# Patient Record
Sex: MALE | Race: WHITE | NOT HISPANIC OR LATINO | Employment: FULL TIME | URBAN - METROPOLITAN AREA
[De-identification: names, ages, dates, MRNs, and addresses within clinical notes are randomized per-mention and may not be internally consistent; named-entity substitution may affect disease eponyms.]

---

## 2019-10-02 ENCOUNTER — APPOINTMENT (OUTPATIENT)
Dept: PREADMISSION TESTING | Facility: HOSPITAL | Age: 54
End: 2019-10-02

## 2019-10-02 ENCOUNTER — HOSPITAL ENCOUNTER (OUTPATIENT)
Dept: GENERAL RADIOLOGY | Facility: HOSPITAL | Age: 54
Discharge: HOME | End: 2019-10-02

## 2019-10-02 ENCOUNTER — HOSPITAL ENCOUNTER (OUTPATIENT)
Dept: GENERAL RADIOLOGY | Facility: HOSPITAL | Age: 54
Discharge: HOME | End: 2019-10-02
Admitting: ORTHOPAEDIC SURGERY

## 2019-10-02 VITALS
HEART RATE: 65 BPM | OXYGEN SATURATION: 100 % | HEIGHT: 70 IN | WEIGHT: 200 LBS | RESPIRATION RATE: 20 BRPM | TEMPERATURE: 97 F | BODY MASS INDEX: 28.63 KG/M2 | SYSTOLIC BLOOD PRESSURE: 122 MMHG | DIASTOLIC BLOOD PRESSURE: 84 MMHG

## 2019-10-02 DIAGNOSIS — Z96.641 HISTORY OF TOTAL RIGHT HIP REPLACEMENT: ICD-10-CM

## 2019-10-02 LAB
ABO GROUP BLD: NORMAL
ALBUMIN SERPL-MCNC: 4.6 G/DL (ref 3.5–5.2)
ALBUMIN/GLOB SERPL: 2.6 G/DL
ALP SERPL-CCNC: 44 U/L (ref 39–117)
ALT SERPL W P-5'-P-CCNC: 17 U/L (ref 1–41)
ANION GAP SERPL CALCULATED.3IONS-SCNC: 8.9 MMOL/L (ref 5–15)
AST SERPL-CCNC: 15 U/L (ref 1–40)
BILIRUB SERPL-MCNC: 1.4 MG/DL (ref 0.2–1.2)
BLD GP AB SCN SERPL QL: NEGATIVE
BUN BLD-MCNC: 17 MG/DL (ref 6–20)
BUN/CREAT SERPL: 20.5 (ref 7–25)
CALCIUM SPEC-SCNC: 9.2 MG/DL (ref 8.6–10.5)
CHLORIDE SERPL-SCNC: 107 MMOL/L (ref 98–107)
CO2 SERPL-SCNC: 27.1 MMOL/L (ref 22–29)
CREAT BLD-MCNC: 0.83 MG/DL (ref 0.76–1.27)
DEPRECATED RDW RBC AUTO: 41.3 FL (ref 37–54)
ERYTHROCYTE [DISTWIDTH] IN BLOOD BY AUTOMATED COUNT: 12.5 % (ref 12.3–15.4)
GFR SERPL CREATININE-BSD FRML MDRD: 96 ML/MIN/1.73
GLOBULIN UR ELPH-MCNC: 1.8 GM/DL
GLUCOSE BLD-MCNC: 105 MG/DL (ref 65–99)
HBA1C MFR BLD: 5.2 % (ref 4.8–5.6)
HCT VFR BLD AUTO: 44.1 % (ref 37.5–51)
HGB BLD-MCNC: 14.1 G/DL (ref 13–17.7)
INR PPP: 1 (ref 0.9–1.1)
MCH RBC QN AUTO: 28.7 PG (ref 26.6–33)
MCHC RBC AUTO-ENTMCNC: 32 G/DL (ref 31.5–35.7)
MCV RBC AUTO: 89.6 FL (ref 79–97)
PLATELET # BLD AUTO: 167 10*3/MM3 (ref 140–450)
PMV BLD AUTO: 9.8 FL (ref 6–12)
POTASSIUM BLD-SCNC: 4.1 MMOL/L (ref 3.5–5.2)
PROT SERPL-MCNC: 6.4 G/DL (ref 6–8.5)
PROTHROMBIN TIME: 12.9 SECONDS (ref 11.7–14.2)
RBC # BLD AUTO: 4.92 10*6/MM3 (ref 4.14–5.8)
RH BLD: POSITIVE
SODIUM BLD-SCNC: 143 MMOL/L (ref 136–145)
T&S EXPIRATION DATE: NORMAL
WBC NRBC COR # BLD: 3.25 10*3/MM3 (ref 3.4–10.8)

## 2019-10-02 PROCEDURE — 73502 X-RAY EXAM HIP UNI 2-3 VIEWS: CPT

## 2019-10-02 PROCEDURE — 86850 RBC ANTIBODY SCREEN: CPT | Performed by: ORTHOPAEDIC SURGERY

## 2019-10-02 PROCEDURE — 93005 ELECTROCARDIOGRAM TRACING: CPT

## 2019-10-02 PROCEDURE — 85610 PROTHROMBIN TIME: CPT | Performed by: ORTHOPAEDIC SURGERY

## 2019-10-02 PROCEDURE — 83036 HEMOGLOBIN GLYCOSYLATED A1C: CPT | Performed by: ORTHOPAEDIC SURGERY

## 2019-10-02 PROCEDURE — 71046 X-RAY EXAM CHEST 2 VIEWS: CPT

## 2019-10-02 PROCEDURE — 86901 BLOOD TYPING SEROLOGIC RH(D): CPT | Performed by: ORTHOPAEDIC SURGERY

## 2019-10-02 PROCEDURE — 36415 COLL VENOUS BLD VENIPUNCTURE: CPT

## 2019-10-02 PROCEDURE — 93010 ELECTROCARDIOGRAM REPORT: CPT | Performed by: INTERNAL MEDICINE

## 2019-10-02 PROCEDURE — 85027 COMPLETE CBC AUTOMATED: CPT | Performed by: ORTHOPAEDIC SURGERY

## 2019-10-02 PROCEDURE — 80053 COMPREHEN METABOLIC PANEL: CPT | Performed by: ORTHOPAEDIC SURGERY

## 2019-10-02 PROCEDURE — 86900 BLOOD TYPING SEROLOGIC ABO: CPT | Performed by: ORTHOPAEDIC SURGERY

## 2019-10-02 RX ORDER — CELECOXIB 100 MG/1
100 CAPSULE ORAL DAILY
COMMUNITY

## 2019-10-02 RX ORDER — AMLODIPINE BESYLATE AND BENAZEPRIL HYDROCHLORIDE 10; 40 MG/1; MG/1
CAPSULE ORAL
COMMUNITY
Start: 2019-02-15

## 2019-10-02 RX ORDER — IBUPROFEN 800 MG/1
1 TABLET ORAL 3 TIMES DAILY
Refills: 5 | COMMUNITY
Start: 2019-09-10 | End: 2019-10-17 | Stop reason: HOSPADM

## 2019-10-02 ASSESSMENT — HOOS JR
HOOS JR SCORE: 32.735
HOOS JR SCORE: 18

## 2019-10-02 NOTE — DISCHARGE INSTRUCTIONS
Take the following medications the morning of surgery with a small sip of water: AMLODIPINE AM OF SURGERY    PLEASE ARRIVE AT THE  HOSPITAL AT: 0700 AM    General Instructions:  • Do not eat solid food after midnight the night before surgery.  • You may drink clear liquids day of surgery but must stop at least one hour before your hospital arrival time.  • It is beneficial for you to have a clear drink that contains carbohydrates the day of surgery.  We suggest a 12 to 20 ounce bottle of Gatorade or Powerade for non-diabetic patients or a 12 to 20 ounce bottle of G2 or Powerade Zero for diabetic patients. (Pediatric patients, are not advised to drink a 12 to 20 ounce carbohydrate drink)    Clear liquids are liquids you can see through.  Nothing red in color.     Plain water                               Sports drinks  Sodas                                   Gelatin (Jell-O)  Fruit juices without pulp such as white grape juice and apple juice  Popsicles that contain no fruit or yogurt  Tea or coffee (no cream or milk added)  Gatorade / Powerade  G2 / Powerade Zero    • Infants may have breast milk up to four hours before surgery.  • Infants drinking formula may drink formula up to six hours before surgery.   • Patients who avoid smoking, chewing tobacco and alcohol for 4 weeks prior to surgery have a reduced risk of post-operative complications.  Quit smoking as many days before surgery as you can.  • Do not smoke, use chewing tobacco or drink alcohol the day of surgery.   • If applicable bring your C-PAP/ BI-PAP machine.  • Bring any papers given to you in the doctor’s office.  • Wear clean comfortable clothes.  • Do not wear contact lenses, false eyelashes or make-up.  Bring a case for your glasses.   • Bring crutches or walker if applicable.  • Remove all piercings.  Leave jewelry and any other valuables at home.  • Hair extensions with metal clips must be removed prior to surgery.  • The Pre-Admission Testing  nurse will instruct you to bring medications if unable to obtain an accurate list in Pre-Admission Testing.        If you were given a blood bank ID arm band remember to bring it with you the day of surgery.    Preventing a Surgical Site Infection:  • For 2 to 3 days before surgery, avoid shaving with a razor because the razor can irritate skin and make it easier to develop an infection.    • Any areas of open skin can increase the risk of a post-operative wound infection by allowing bacteria to enter and travel throughout the body.  Notify your surgeon if you have any skin wounds / rashes even if it is not near the expected surgical site.  The area will need assessed to determine if surgery should be delayed until it is healed.  • The night prior to surgery sleep in a clean bed with clean clothing.  Do not allow pets to sleep with you.  • Shower on the morning of surgery using a fresh bar of anti-bacterial soap (such as Dial) and clean washcloth.  Dry with a clean towel and dress in clean clothing.  • Ask your surgeon if you will be receiving antibiotics prior to surgery.  • Make sure you, your family, and all healthcare providers clean their hands with soap and water or an alcohol based hand  before caring for you or your wound.    Day of surgery:  Your arrival time is approximately two hours before your scheduled surgery time.  Upon arrival, a Pre-op nurse and Anesthesiologist will review your health history, obtain vital signs, and answer questions you may have.  The only belongings needed at this time will be a list of your home medications and if applicable your C-PAP/BI-PAP machine.  If you are staying overnight your family can leave the rest of your belongings in the car and bring them to your room later.  A Pre-op nurse will start an IV and you may receive medication in preparation for surgery, including something to help you relax.  Your family will be able to see you in the Pre-op area.  Two  visitors at a time will be allowed in the Pre-op room.  While you are in surgery your family should notify the waiting room  if they leave the waiting room area and provide a contact phone number.    Please be aware that surgery does come with discomfort.  We want to make every effort to control your discomfort so please discuss any uncontrolled symptoms with your nurse.   Your doctor will most likely have prescribed pain medications.      If you are going home after surgery you will receive individualized written care instructions before being discharged.  A responsible adult must drive you to and from the hospital on the day of your surgery and stay with you for 24 hours.    If you are staying overnight following surgery, you will be transported to your hospital room following the recovery period.  [facility] has all private rooms.    You have received a list of surgical assistants for your reference.  If you have any questions please call Pre-Admission Testing at [phone].  Deductibles and co-payments are collected on the day of service. Please be prepared to pay the required co-pay, deductible or deposit on the day of service as defined by your plan.    2% CHLORAHEXIDINE GLUCONATE* CLOTH  Preparing or “prepping” skin before surgery can reduce the risk of infection at the surgical site. To make the process easier, [facility] has chosen disposable cloths moistened with a rinse-free, 2% Chlorhexidine Gluconate (CHG) antiseptic solution. The steps below outline the prepping process and should be carefully followed.        Use the prep cloth on the area that is circled in the diagram             Directions Night before Surgery  1) Shower using a fresh bar of anti-bacterial soap (such as Dial) and clean washcloth.  Use a clean towel to completely dry your skin.  2) Do not use any lotions, oils or creams on your skin.  3) Open the package and remove 1 cloth, wipe your skin for  30 seconds in a circular motion.  Allow to dry for 3 minutes.  4) Repeat #3 with second cloth.  5) Do not touch your eyes, ears, or mouth with the prep cloth.  6) Allow the wet prep solution to air dry.  7) Discard the prep cloth and wash your hands with soap and water.   8) Dress in clean bed clothes and sleep on fresh clean bed sheets.   9) You may experience some temporary itching after the prep.    Directions Day of Surgery  1) Repeat steps 1,2,3,4,5,6,7, and 9.   2) Dress in clean clothes before coming to the hospital.    BACTROBAN NASAL OINTMENT  There are many germs normally in your nose. Bactroban is an ointment that will help reduce these germs. Please follow these instructions for Bactroban use:      ____The day before surgery in the morning  Date________    ____The day before surgery in the evening              Date________    ____The day of surgery in the morning    Date________    **Squirt ½ package of Bactroban Ointment onto a cotton applicator and apply to inside of 1st nostril.  Squirt the remaining Bactroban and apply to the inside of the other nostril.    PERIDEX- ORAL:  Use only if your surgeon has ordered  Use the night before and morning of surgery - Swish, gargle, and spit - do not swallow.

## 2019-10-02 NOTE — PAT
"Here for PAT prior to sched RTHA with Dr Crisostomo on 10/16.  This is first joint replacement surg.  Has had hernia surgeries in past.  No problems with anesthesia.    PMH includes OA, RA, HTN and SOM (uses pap).  Takes monthly infusions for RA and states it is well controlled at present.    Drives a truck for work.  Occasional smoker.  Mod ETOH use.  \"[place]". .    States has felt well lately..No acute resp/GI illnesses.  Denies CP, SOA, MORALES, palpitations, syncope.      Exam reveals pleasant male in NAD.  Skintanned, intact.  Neuro grossly intact.  Chest CTA bilat.  S1S2 RRR.  No edema noted.    Prelim EKG shows NSR.  Labs WNL.  XRays pending.    10/2 @ 1552    Xrays reviewed.  All testing WNL.  Pt seeing PCP today, but no apparent contraindications to proposed surgery.  "

## 2019-10-15 RX ORDER — CEFAZOLIN SODIUM 2 G/100ML
2 INJECTION, SOLUTION INTRAVENOUS ONCE
Status: COMPLETED | OUTPATIENT
Start: 2019-10-15 | End: 2019-10-16

## 2019-10-16 ENCOUNTER — APPOINTMENT (OUTPATIENT)
Dept: GENERAL RADIOLOGY | Facility: HOSPITAL | Age: 54
End: 2019-10-16

## 2019-10-16 ENCOUNTER — ANESTHESIA EVENT (OUTPATIENT)
Dept: PERIOP | Facility: HOSPITAL | Age: 54
End: 2019-10-16

## 2019-10-16 ENCOUNTER — ANESTHESIA (OUTPATIENT)
Dept: PERIOP | Facility: HOSPITAL | Age: 54
End: 2019-10-16

## 2019-10-16 ENCOUNTER — HOSPITAL ENCOUNTER (OUTPATIENT)
Facility: HOSPITAL | Age: 54
Discharge: HOME | End: 2019-10-17
Attending: ORTHOPAEDIC SURGERY | Admitting: ORTHOPAEDIC SURGERY

## 2019-10-16 DIAGNOSIS — Z96.641 HISTORY OF TOTAL RIGHT HIP REPLACEMENT: Primary | ICD-10-CM

## 2019-10-16 PROBLEM — Z96.649 H/O TOTAL HIP ARTHROPLASTY: Status: ACTIVE | Noted: 2019-10-16

## 2019-10-16 PROCEDURE — C1776 JOINT DEVICE (IMPLANTABLE): HCPCS | Performed by: ORTHOPAEDIC SURGERY

## 2019-10-16 PROCEDURE — 25010000002 MIDAZOLAM PER 1 MG: Performed by: ANESTHESIOLOGY

## 2019-10-16 PROCEDURE — 94799 UNLISTED PULMONARY SVC/PX: CPT

## 2019-10-16 PROCEDURE — 97110 THERAPEUTIC EXERCISES: CPT

## 2019-10-16 PROCEDURE — 25010000002 ONDANSETRON PER 1 MG: Performed by: NURSE ANESTHETIST, CERTIFIED REGISTERED

## 2019-10-16 PROCEDURE — 73501 X-RAY EXAM HIP UNI 1 VIEW: CPT

## 2019-10-16 PROCEDURE — 25010000002 FENTANYL CITRATE (PF) 100 MCG/2ML SOLUTION: Performed by: NURSE ANESTHETIST, CERTIFIED REGISTERED

## 2019-10-16 PROCEDURE — 25010000002 NEOSTIGMINE PER 0.5 MG: Performed by: NURSE ANESTHETIST, CERTIFIED REGISTERED

## 2019-10-16 PROCEDURE — 25010000002 ROPIVACAINE PER 1 MG: Performed by: ORTHOPAEDIC SURGERY

## 2019-10-16 PROCEDURE — 25010000002 DEXAMETHASONE PER 1 MG: Performed by: NURSE ANESTHETIST, CERTIFIED REGISTERED

## 2019-10-16 PROCEDURE — 25010000002 HYDROMORPHONE PER 4 MG: Performed by: NURSE ANESTHETIST, CERTIFIED REGISTERED

## 2019-10-16 PROCEDURE — G0378 HOSPITAL OBSERVATION PER HR: HCPCS

## 2019-10-16 PROCEDURE — 72170 X-RAY EXAM OF PELVIS: CPT

## 2019-10-16 PROCEDURE — 25010000002 PROPOFOL 10 MG/ML EMULSION: Performed by: NURSE ANESTHETIST, CERTIFIED REGISTERED

## 2019-10-16 PROCEDURE — 76000 FLUOROSCOPY <1 HR PHYS/QHP: CPT

## 2019-10-16 PROCEDURE — 25010000003 CEFAZOLIN IN DEXTROSE 2-4 GM/100ML-% SOLUTION: Performed by: ORTHOPAEDIC SURGERY

## 2019-10-16 PROCEDURE — 25010000002 KETOROLAC TROMETHAMINE PER 15 MG: Performed by: ORTHOPAEDIC SURGERY

## 2019-10-16 PROCEDURE — 25010000002 KETOROLAC TROMETHAMINE PER 15 MG: Performed by: NURSE ANESTHETIST, CERTIFIED REGISTERED

## 2019-10-16 PROCEDURE — 97162 PT EVAL MOD COMPLEX 30 MIN: CPT

## 2019-10-16 PROCEDURE — 25010000002 CLONIDINE PER 1 MG: Performed by: ORTHOPAEDIC SURGERY

## 2019-10-16 DEVICE — CAP HIP VE UPCHRG: Type: IMPLANTABLE DEVICE | Site: HIP | Status: FUNCTIONAL

## 2019-10-16 DEVICE — SHLL ACET OSSEOTI G7 4H SZF 56MM: Type: IMPLANTABLE DEVICE | Site: HIP | Status: FUNCTIONAL

## 2019-10-16 DEVICE — IMPLANTABLE DEVICE
Type: IMPLANTABLE DEVICE | Site: HIP | Status: FUNCTIONAL
Brand: ECHO BI-METRIC MICROPLASTY HIP SYSTEM

## 2019-10-16 DEVICE — IMPLANTABLE DEVICE
Type: IMPLANTABLE DEVICE | Site: HIP | Status: FUNCTIONAL
Brand: BIOLOX® DELTA HIP SYSTEM

## 2019-10-16 DEVICE — SCRW ACET CORT TRILOGY S/TAP 6.5X25: Type: IMPLANTABLE DEVICE | Site: HIP | Status: FUNCTIONAL

## 2019-10-16 DEVICE — TOTAL HIP PRIMARY: Type: IMPLANTABLE DEVICE | Site: HIP | Status: FUNCTIONAL

## 2019-10-16 DEVICE — SCRW ACET CORT TRILOGY S/TAP 6.5X30: Type: IMPLANTABLE DEVICE | Site: HIP | Status: FUNCTIONAL

## 2019-10-16 DEVICE — CAP HIP TM UPCHRG: Type: IMPLANTABLE DEVICE | Site: HIP | Status: FUNCTIONAL

## 2019-10-16 DEVICE — CAP CERAM HD HIP UPCHRG: Type: IMPLANTABLE DEVICE | Site: HIP | Status: FUNCTIONAL

## 2019-10-16 DEVICE — IMPLANTABLE DEVICE: Type: IMPLANTABLE DEVICE | Site: HIP | Status: FUNCTIONAL

## 2019-10-16 RX ORDER — KETAMINE HYDROCHLORIDE 10 MG/ML
INJECTION INTRAMUSCULAR; INTRAVENOUS AS NEEDED
Status: DISCONTINUED | OUTPATIENT
Start: 2019-10-16 | End: 2019-10-16 | Stop reason: SURG

## 2019-10-16 RX ORDER — MAGNESIUM HYDROXIDE 1200 MG/15ML
LIQUID ORAL AS NEEDED
Status: DISCONTINUED | OUTPATIENT
Start: 2019-10-16 | End: 2019-10-16 | Stop reason: HOSPADM

## 2019-10-16 RX ORDER — DEXAMETHASONE SODIUM PHOSPHATE 10 MG/ML
INJECTION INTRAMUSCULAR; INTRAVENOUS AS NEEDED
Status: DISCONTINUED | OUTPATIENT
Start: 2019-10-16 | End: 2019-10-16 | Stop reason: SURG

## 2019-10-16 RX ORDER — CEFAZOLIN SODIUM 2 G/100ML
2 INJECTION, SOLUTION INTRAVENOUS EVERY 8 HOURS
Status: COMPLETED | OUTPATIENT
Start: 2019-10-16 | End: 2019-10-17

## 2019-10-16 RX ORDER — EPHEDRINE SULFATE 50 MG/ML
INJECTION, SOLUTION INTRAVENOUS AS NEEDED
Status: DISCONTINUED | OUTPATIENT
Start: 2019-10-16 | End: 2019-10-16 | Stop reason: SURG

## 2019-10-16 RX ORDER — ACETAMINOPHEN 500 MG
1000 TABLET ORAL ONCE
Status: COMPLETED | OUTPATIENT
Start: 2019-10-16 | End: 2019-10-16

## 2019-10-16 RX ORDER — NALOXONE HCL 0.4 MG/ML
0.2 VIAL (ML) INJECTION AS NEEDED
Status: DISCONTINUED | OUTPATIENT
Start: 2019-10-16 | End: 2019-10-16 | Stop reason: HOSPADM

## 2019-10-16 RX ORDER — FAMOTIDINE 10 MG/ML
20 INJECTION, SOLUTION INTRAVENOUS ONCE
Status: COMPLETED | OUTPATIENT
Start: 2019-10-16 | End: 2019-10-16

## 2019-10-16 RX ORDER — DOCUSATE SODIUM 100 MG/1
100 CAPSULE, LIQUID FILLED ORAL 2 TIMES DAILY PRN
Status: DISCONTINUED | OUTPATIENT
Start: 2019-10-16 | End: 2019-10-17 | Stop reason: HOSPADM

## 2019-10-16 RX ORDER — SODIUM CHLORIDE 0.9 % (FLUSH) 0.9 %
3 SYRINGE (ML) INJECTION EVERY 12 HOURS SCHEDULED
Status: DISCONTINUED | OUTPATIENT
Start: 2019-10-16 | End: 2019-10-17 | Stop reason: HOSPADM

## 2019-10-16 RX ORDER — MIDAZOLAM HYDROCHLORIDE 1 MG/ML
1 INJECTION INTRAMUSCULAR; INTRAVENOUS
Status: DISCONTINUED | OUTPATIENT
Start: 2019-10-16 | End: 2019-10-16 | Stop reason: HOSPADM

## 2019-10-16 RX ORDER — ASPIRIN 325 MG
325 TABLET, DELAYED RELEASE (ENTERIC COATED) ORAL EVERY 12 HOURS SCHEDULED
Status: DISCONTINUED | OUTPATIENT
Start: 2019-10-16 | End: 2019-10-17 | Stop reason: HOSPADM

## 2019-10-16 RX ORDER — SODIUM CHLORIDE, SODIUM LACTATE, POTASSIUM CHLORIDE, CALCIUM CHLORIDE 600; 310; 30; 20 MG/100ML; MG/100ML; MG/100ML; MG/100ML
125 INJECTION, SOLUTION INTRAVENOUS CONTINUOUS
Status: DISCONTINUED | OUTPATIENT
Start: 2019-10-16 | End: 2019-10-17 | Stop reason: HOSPADM

## 2019-10-16 RX ORDER — ACETAMINOPHEN 325 MG/1
650 TABLET ORAL ONCE AS NEEDED
Status: DISCONTINUED | OUTPATIENT
Start: 2019-10-16 | End: 2019-10-16 | Stop reason: HOSPADM

## 2019-10-16 RX ORDER — FENTANYL CITRATE 50 UG/ML
INJECTION, SOLUTION INTRAMUSCULAR; INTRAVENOUS AS NEEDED
Status: DISCONTINUED | OUTPATIENT
Start: 2019-10-16 | End: 2019-10-16 | Stop reason: SURG

## 2019-10-16 RX ORDER — SODIUM CHLORIDE 0.9 % (FLUSH) 0.9 %
3-10 SYRINGE (ML) INJECTION AS NEEDED
Status: DISCONTINUED | OUTPATIENT
Start: 2019-10-16 | End: 2019-10-17 | Stop reason: HOSPADM

## 2019-10-16 RX ORDER — PROMETHAZINE HYDROCHLORIDE 25 MG/1
25 TABLET ORAL ONCE AS NEEDED
Status: DISCONTINUED | OUTPATIENT
Start: 2019-10-16 | End: 2019-10-16 | Stop reason: HOSPADM

## 2019-10-16 RX ORDER — MIDAZOLAM HYDROCHLORIDE 1 MG/ML
2 INJECTION INTRAMUSCULAR; INTRAVENOUS
Status: DISCONTINUED | OUTPATIENT
Start: 2019-10-16 | End: 2019-10-16 | Stop reason: HOSPADM

## 2019-10-16 RX ORDER — KETOROLAC TROMETHAMINE 30 MG/ML
INJECTION, SOLUTION INTRAMUSCULAR; INTRAVENOUS AS NEEDED
Status: DISCONTINUED | OUTPATIENT
Start: 2019-10-16 | End: 2019-10-16 | Stop reason: SURG

## 2019-10-16 RX ORDER — PROMETHAZINE HYDROCHLORIDE 25 MG/ML
12.5 INJECTION, SOLUTION INTRAMUSCULAR; INTRAVENOUS ONCE AS NEEDED
Status: DISCONTINUED | OUTPATIENT
Start: 2019-10-16 | End: 2019-10-16 | Stop reason: HOSPADM

## 2019-10-16 RX ORDER — FENTANYL CITRATE 50 UG/ML
50 INJECTION, SOLUTION INTRAMUSCULAR; INTRAVENOUS
Status: DISCONTINUED | OUTPATIENT
Start: 2019-10-16 | End: 2019-10-16 | Stop reason: HOSPADM

## 2019-10-16 RX ORDER — GLYCOPYRROLATE 0.2 MG/ML
INJECTION INTRAMUSCULAR; INTRAVENOUS AS NEEDED
Status: DISCONTINUED | OUTPATIENT
Start: 2019-10-16 | End: 2019-10-16 | Stop reason: SURG

## 2019-10-16 RX ORDER — HYDROMORPHONE HCL 110MG/55ML
PATIENT CONTROLLED ANALGESIA SYRINGE INTRAVENOUS AS NEEDED
Status: DISCONTINUED | OUTPATIENT
Start: 2019-10-16 | End: 2019-10-16 | Stop reason: SURG

## 2019-10-16 RX ORDER — HYDROMORPHONE HYDROCHLORIDE 1 MG/ML
0.5 INJECTION, SOLUTION INTRAMUSCULAR; INTRAVENOUS; SUBCUTANEOUS
Status: DISCONTINUED | OUTPATIENT
Start: 2019-10-16 | End: 2019-10-16 | Stop reason: HOSPADM

## 2019-10-16 RX ORDER — HYDROCODONE BITARTRATE AND ACETAMINOPHEN 7.5; 325 MG/1; MG/1
1 TABLET ORAL EVERY 4 HOURS PRN
Status: DISCONTINUED | OUTPATIENT
Start: 2019-10-16 | End: 2019-10-17 | Stop reason: HOSPADM

## 2019-10-16 RX ORDER — SODIUM CHLORIDE, SODIUM LACTATE, POTASSIUM CHLORIDE, CALCIUM CHLORIDE 600; 310; 30; 20 MG/100ML; MG/100ML; MG/100ML; MG/100ML
9 INJECTION, SOLUTION INTRAVENOUS CONTINUOUS
Status: DISCONTINUED | OUTPATIENT
Start: 2019-10-16 | End: 2019-10-16

## 2019-10-16 RX ORDER — LIDOCAINE HYDROCHLORIDE 20 MG/ML
INJECTION, SOLUTION INFILTRATION; PERINEURAL AS NEEDED
Status: DISCONTINUED | OUTPATIENT
Start: 2019-10-16 | End: 2019-10-16 | Stop reason: SURG

## 2019-10-16 RX ORDER — PROMETHAZINE HYDROCHLORIDE 25 MG/ML
6.25 INJECTION, SOLUTION INTRAMUSCULAR; INTRAVENOUS
Status: DISCONTINUED | OUTPATIENT
Start: 2019-10-16 | End: 2019-10-16 | Stop reason: HOSPADM

## 2019-10-16 RX ORDER — ONDANSETRON 2 MG/ML
INJECTION INTRAMUSCULAR; INTRAVENOUS AS NEEDED
Status: DISCONTINUED | OUTPATIENT
Start: 2019-10-16 | End: 2019-10-16 | Stop reason: SURG

## 2019-10-16 RX ORDER — HYDROMORPHONE HYDROCHLORIDE 1 MG/ML
0.5 INJECTION, SOLUTION INTRAMUSCULAR; INTRAVENOUS; SUBCUTANEOUS
Status: DISCONTINUED | OUTPATIENT
Start: 2019-10-16 | End: 2019-10-17 | Stop reason: HOSPADM

## 2019-10-16 RX ORDER — KETOROLAC TROMETHAMINE 30 MG/ML
15 INJECTION, SOLUTION INTRAMUSCULAR; INTRAVENOUS EVERY 6 HOURS
Status: DISCONTINUED | OUTPATIENT
Start: 2019-10-16 | End: 2019-10-17 | Stop reason: HOSPADM

## 2019-10-16 RX ORDER — ALBUTEROL SULFATE 2.5 MG/3ML
2.5 SOLUTION RESPIRATORY (INHALATION) ONCE AS NEEDED
Status: DISCONTINUED | OUTPATIENT
Start: 2019-10-16 | End: 2019-10-16 | Stop reason: HOSPADM

## 2019-10-16 RX ORDER — GABAPENTIN 300 MG/1
600 CAPSULE ORAL ONCE
Status: COMPLETED | OUTPATIENT
Start: 2019-10-16 | End: 2019-10-16

## 2019-10-16 RX ORDER — ONDANSETRON 2 MG/ML
4 INJECTION INTRAMUSCULAR; INTRAVENOUS EVERY 6 HOURS PRN
Status: DISCONTINUED | OUTPATIENT
Start: 2019-10-16 | End: 2019-10-17 | Stop reason: HOSPADM

## 2019-10-16 RX ORDER — PROMETHAZINE HYDROCHLORIDE 25 MG/1
25 SUPPOSITORY RECTAL ONCE AS NEEDED
Status: DISCONTINUED | OUTPATIENT
Start: 2019-10-16 | End: 2019-10-16 | Stop reason: HOSPADM

## 2019-10-16 RX ORDER — TRANEXAMIC ACID 100 MG/ML
INJECTION, SOLUTION INTRAVENOUS AS NEEDED
Status: DISCONTINUED | OUTPATIENT
Start: 2019-10-16 | End: 2019-10-16 | Stop reason: SURG

## 2019-10-16 RX ORDER — PROPOFOL 10 MG/ML
VIAL (ML) INTRAVENOUS AS NEEDED
Status: DISCONTINUED | OUTPATIENT
Start: 2019-10-16 | End: 2019-10-16 | Stop reason: SURG

## 2019-10-16 RX ORDER — ACETAMINOPHEN 325 MG/1
325 TABLET ORAL EVERY 4 HOURS PRN
Status: DISCONTINUED | OUTPATIENT
Start: 2019-10-16 | End: 2019-10-17 | Stop reason: HOSPADM

## 2019-10-16 RX ORDER — SODIUM CHLORIDE 0.9 % (FLUSH) 0.9 %
3 SYRINGE (ML) INJECTION EVERY 12 HOURS SCHEDULED
Status: DISCONTINUED | OUTPATIENT
Start: 2019-10-16 | End: 2019-10-16 | Stop reason: HOSPADM

## 2019-10-16 RX ORDER — ONDANSETRON 2 MG/ML
4 INJECTION INTRAMUSCULAR; INTRAVENOUS ONCE AS NEEDED
Status: DISCONTINUED | OUTPATIENT
Start: 2019-10-16 | End: 2019-10-16 | Stop reason: HOSPADM

## 2019-10-16 RX ORDER — HYDROCODONE BITARTRATE AND ACETAMINOPHEN 7.5; 325 MG/1; MG/1
1 TABLET ORAL ONCE AS NEEDED
Status: DISCONTINUED | OUTPATIENT
Start: 2019-10-16 | End: 2019-10-16 | Stop reason: HOSPADM

## 2019-10-16 RX ORDER — FLUMAZENIL 0.1 MG/ML
0.2 INJECTION INTRAVENOUS AS NEEDED
Status: DISCONTINUED | OUTPATIENT
Start: 2019-10-16 | End: 2019-10-16 | Stop reason: HOSPADM

## 2019-10-16 RX ORDER — SODIUM CHLORIDE 0.9 % (FLUSH) 0.9 %
3-10 SYRINGE (ML) INJECTION AS NEEDED
Status: DISCONTINUED | OUTPATIENT
Start: 2019-10-16 | End: 2019-10-16 | Stop reason: HOSPADM

## 2019-10-16 RX ORDER — LABETALOL HYDROCHLORIDE 5 MG/ML
5 INJECTION, SOLUTION INTRAVENOUS
Status: DISCONTINUED | OUTPATIENT
Start: 2019-10-16 | End: 2019-10-16 | Stop reason: HOSPADM

## 2019-10-16 RX ORDER — NALOXONE HCL 0.4 MG/ML
0.1 VIAL (ML) INJECTION
Status: DISCONTINUED | OUTPATIENT
Start: 2019-10-16 | End: 2019-10-17 | Stop reason: HOSPADM

## 2019-10-16 RX ORDER — TRAMADOL HYDROCHLORIDE 50 MG/1
50 TABLET ORAL EVERY 8 HOURS PRN
Status: DISCONTINUED | OUTPATIENT
Start: 2019-10-16 | End: 2019-10-17 | Stop reason: HOSPADM

## 2019-10-16 RX ORDER — DIPHENHYDRAMINE HCL 25 MG
25 CAPSULE ORAL
Status: DISCONTINUED | OUTPATIENT
Start: 2019-10-16 | End: 2019-10-16 | Stop reason: HOSPADM

## 2019-10-16 RX ORDER — ROCURONIUM BROMIDE 10 MG/ML
INJECTION, SOLUTION INTRAVENOUS AS NEEDED
Status: DISCONTINUED | OUTPATIENT
Start: 2019-10-16 | End: 2019-10-16 | Stop reason: SURG

## 2019-10-16 RX ORDER — LIDOCAINE HYDROCHLORIDE 10 MG/ML
0.5 INJECTION, SOLUTION EPIDURAL; INFILTRATION; INTRACAUDAL; PERINEURAL ONCE AS NEEDED
Status: DISCONTINUED | OUTPATIENT
Start: 2019-10-16 | End: 2019-10-16 | Stop reason: HOSPADM

## 2019-10-16 RX ORDER — BISACODYL 5 MG/1
10 TABLET, DELAYED RELEASE ORAL DAILY PRN
Status: DISCONTINUED | OUTPATIENT
Start: 2019-10-16 | End: 2019-10-17 | Stop reason: HOSPADM

## 2019-10-16 RX ORDER — DIPHENHYDRAMINE HYDROCHLORIDE 50 MG/ML
12.5 INJECTION INTRAMUSCULAR; INTRAVENOUS
Status: DISCONTINUED | OUTPATIENT
Start: 2019-10-16 | End: 2019-10-16 | Stop reason: HOSPADM

## 2019-10-16 RX ORDER — HYDROCODONE BITARTRATE AND ACETAMINOPHEN 7.5; 325 MG/1; MG/1
2 TABLET ORAL EVERY 4 HOURS PRN
Status: DISCONTINUED | OUTPATIENT
Start: 2019-10-16 | End: 2019-10-17 | Stop reason: HOSPADM

## 2019-10-16 RX ORDER — HYDRALAZINE HYDROCHLORIDE 20 MG/ML
5 INJECTION INTRAMUSCULAR; INTRAVENOUS
Status: DISCONTINUED | OUTPATIENT
Start: 2019-10-16 | End: 2019-10-16 | Stop reason: HOSPADM

## 2019-10-16 RX ADMIN — EPHEDRINE SULFATE 10 MG: 50 INJECTION INTRAMUSCULAR; INTRAVENOUS; SUBCUTANEOUS at 10:46

## 2019-10-16 RX ADMIN — NEOSTIGMINE METHYLSULFATE 4 MG: 1 INJECTION INTRAMUSCULAR; INTRAVENOUS; SUBCUTANEOUS at 12:17

## 2019-10-16 RX ADMIN — Medication 1 MG: at 08:59

## 2019-10-16 RX ADMIN — HYDROCODONE BITARTRATE AND ACETAMINOPHEN 2 TABLET: 7.5; 325 TABLET ORAL at 21:06

## 2019-10-16 RX ADMIN — ROCURONIUM BROMIDE 40 MG: 10 INJECTION INTRAVENOUS at 10:10

## 2019-10-16 RX ADMIN — KETOROLAC TROMETHAMINE 15 MG: 30 INJECTION, SOLUTION INTRAMUSCULAR at 23:58

## 2019-10-16 RX ADMIN — KETAMINE HYDROCHLORIDE 25 MG: 10 INJECTION INTRAMUSCULAR; INTRAVENOUS at 10:25

## 2019-10-16 RX ADMIN — ROCURONIUM BROMIDE 10 MG: 10 INJECTION INTRAVENOUS at 11:31

## 2019-10-16 RX ADMIN — ONDANSETRON 4 MG: 2 INJECTION INTRAMUSCULAR; INTRAVENOUS at 11:54

## 2019-10-16 RX ADMIN — TRANEXAMIC ACID 1000 MG: 100 INJECTION, SOLUTION INTRAVENOUS at 10:30

## 2019-10-16 RX ADMIN — HYDROMORPHONE HYDROCHLORIDE 0.5 MG: 2 INJECTION INTRAMUSCULAR; INTRAVENOUS; SUBCUTANEOUS at 12:12

## 2019-10-16 RX ADMIN — GLYCOPYRROLATE 0.7 MG: 0.2 INJECTION INTRAMUSCULAR; INTRAVENOUS at 12:17

## 2019-10-16 RX ADMIN — CEFAZOLIN SODIUM 2 G: 2 INJECTION, SOLUTION INTRAVENOUS at 17:06

## 2019-10-16 RX ADMIN — HYDROMORPHONE HYDROCHLORIDE 0.5 MG: 2 INJECTION INTRAMUSCULAR; INTRAVENOUS; SUBCUTANEOUS at 12:21

## 2019-10-16 RX ADMIN — EPHEDRINE SULFATE 10 MG: 50 INJECTION INTRAMUSCULAR; INTRAVENOUS; SUBCUTANEOUS at 10:35

## 2019-10-16 RX ADMIN — FAMOTIDINE 20 MG: 10 INJECTION INTRAVENOUS at 08:36

## 2019-10-16 RX ADMIN — ASPIRIN 325 MG: 325 TABLET, DELAYED RELEASE ORAL at 18:33

## 2019-10-16 RX ADMIN — HYDROCODONE BITARTRATE AND ACETAMINOPHEN 2 TABLET: 7.5; 325 TABLET ORAL at 12:57

## 2019-10-16 RX ADMIN — FENTANYL CITRATE 50 MCG: 50 INJECTION, SOLUTION INTRAMUSCULAR; INTRAVENOUS at 13:00

## 2019-10-16 RX ADMIN — SODIUM CHLORIDE, PRESERVATIVE FREE 3 ML: 5 INJECTION INTRAVENOUS at 21:06

## 2019-10-16 RX ADMIN — MIDAZOLAM 1 MG: 1 INJECTION INTRAMUSCULAR; INTRAVENOUS at 08:35

## 2019-10-16 RX ADMIN — TRANEXAMIC ACID 1000 MG: 100 INJECTION, SOLUTION INTRAVENOUS at 11:47

## 2019-10-16 RX ADMIN — PROPOFOL 200 MG: 10 INJECTION, EMULSION INTRAVENOUS at 10:10

## 2019-10-16 RX ADMIN — SODIUM CHLORIDE, POTASSIUM CHLORIDE, SODIUM LACTATE AND CALCIUM CHLORIDE: 600; 310; 30; 20 INJECTION, SOLUTION INTRAVENOUS at 11:15

## 2019-10-16 RX ADMIN — GABAPENTIN 600 MG: 300 CAPSULE ORAL at 08:35

## 2019-10-16 RX ADMIN — ROCURONIUM BROMIDE 10 MG: 10 INJECTION INTRAVENOUS at 10:59

## 2019-10-16 RX ADMIN — HYDROCODONE BITARTRATE AND ACETAMINOPHEN 2 TABLET: 7.5; 325 TABLET ORAL at 17:07

## 2019-10-16 RX ADMIN — ROCURONIUM BROMIDE 20 MG: 10 INJECTION INTRAVENOUS at 11:17

## 2019-10-16 RX ADMIN — EPHEDRINE SULFATE 10 MG: 50 INJECTION INTRAMUSCULAR; INTRAVENOUS; SUBCUTANEOUS at 11:12

## 2019-10-16 RX ADMIN — CEFAZOLIN SODIUM 2 G: 2 INJECTION, SOLUTION INTRAVENOUS at 10:25

## 2019-10-16 RX ADMIN — KETOROLAC TROMETHAMINE 15 MG: 30 INJECTION, SOLUTION INTRAMUSCULAR at 17:07

## 2019-10-16 RX ADMIN — SODIUM CHLORIDE, POTASSIUM CHLORIDE, SODIUM LACTATE AND CALCIUM CHLORIDE 9 ML/HR: 600; 310; 30; 20 INJECTION, SOLUTION INTRAVENOUS at 08:34

## 2019-10-16 RX ADMIN — ACETAMINOPHEN 1000 MG: 500 TABLET, FILM COATED ORAL at 08:34

## 2019-10-16 RX ADMIN — HYDROMORPHONE HYDROCHLORIDE 0.5 MG: 1 INJECTION, SOLUTION INTRAMUSCULAR; INTRAVENOUS; SUBCUTANEOUS at 13:06

## 2019-10-16 RX ADMIN — DEXAMETHASONE SODIUM PHOSPHATE 8 MG: 10 INJECTION INTRAMUSCULAR; INTRAVENOUS at 10:25

## 2019-10-16 RX ADMIN — LIDOCAINE HYDROCHLORIDE 100 MG: 20 INJECTION, SOLUTION INFILTRATION; PERINEURAL at 10:10

## 2019-10-16 RX ADMIN — KETOROLAC TROMETHAMINE 30 MG: 30 INJECTION, SOLUTION INTRAMUSCULAR; INTRAVENOUS at 11:54

## 2019-10-16 RX ADMIN — FENTANYL CITRATE 100 MCG: 50 INJECTION INTRAMUSCULAR; INTRAVENOUS at 10:10

## 2019-10-16 RX ADMIN — SODIUM CHLORIDE, PRESERVATIVE FREE 3 ML: 5 INJECTION INTRAVENOUS at 14:59

## 2019-10-16 RX ADMIN — EPHEDRINE SULFATE 10 MG: 50 INJECTION INTRAMUSCULAR; INTRAVENOUS; SUBCUTANEOUS at 11:08

## 2019-10-16 RX ADMIN — EPHEDRINE SULFATE 10 MG: 50 INJECTION INTRAMUSCULAR; INTRAVENOUS; SUBCUTANEOUS at 10:36

## 2019-10-16 NOTE — OP NOTE
Right TOTAL HIP ARTHROPLASTY ANTERIOR WITH HANA TABLE  Procedure Note    Anthony Vega  10/16/2019    Pre-op Diagnosis: Right hip osteoarthritis.   Post-op Diagnosis: Same  Procedure:  Right total hip arthroplasty, anterior approach  Surgeon:  Isiaas Crisostomo MD  Assistant: Alfred Carlisle CSA  The services of a first assist were necessary for performing the procedure safely and expeditiously.  The first assist was present for the entire duration of the case and helped with positioning, retraction and closure of the incision.    Anesthesia: General, Anesthesiologist: Melody Cedeno MD  CRNA: Sabrina Riggins CRNA  Staff: Circulator: Brooklynn Jose RN; Spurling, Shannon, RN  Radiology Technologist: Loc Mcclure  Scrub Person: Yuki Velez Tara A  Vendor Representative: Manjit Matthews  Orderly: Keith Nair  Assistant: Alfred Carlisle CSA CFA  Estimated Blood Loss: 150 mL  Specimens: * No orders in the log *  Drains: none  Complications: None    Components Utilized:    Implant Name Type Inv. Item Serial No.  Lot No. LRB No. Used   SHLL ACET OSSEOTI G7 4H SZF 56MM - [id number] Implant SHLL ACET OSSEOTI G7 4H SZF 56MM  [employer] [id number] Right 1   SCRW ACET HERMELINDO TRILOGY S/TAP 6.5X30 - [id number] Implant SCRW ACET HERMELINDO TRILOGY S/TAP 6.5X30  [employer] [id number] Right 1   SCRW ACET HERMELINDO TRILOGY S/TAP 6.5X25 - [id number] Implant SCRW ACET HERMELINDO TRILOGY S/TAP 6.5X25  [employer] [id number] Right 1   LINER ACET G7 NEUTRAL E1 SZF 36MM - [id number] Implant LINER ACET G7 NEUTRAL E1 SZF 36MM  [employer] [id number] Right 1   STEM HIP ECHO BI/METRIC MICROPLASTY PROX HI OFFST 130D SZ12 - [id number] Implant STEM HIP ECHO BI/METRIC MICROPLASTY PROX HI OFFST 130D SZ12  [employer] [id number] Right 1   HD FEM BIOLOX DELTA CERAM NK 36MM STD - [id number] Implant HD FEM BIOLOX DELTA CERAM NK 36MM STD  [employer] [id number] Right 1       Indication for Procedure:  This patient is a 54  y.o. male who has failed conservative treatment for management of arthritis of the operative hip.  Surgical options and non-surgical options were discussed in detail and to the patient's satisfaction.  Surgical intervention was recommended based on the patient's injury and functional status.      The risks and benefits of surgery were discussed with patient and informed consent was obtained.  Risks include but are not limited to, infection, bleeding, nerve injury, blood clots, risks associated with anesthesia, need for further surgery, persistent pain, and possibly death. The risk of leg length discrepancy was also discussed with the patient.    Protocols for intravenous antibiotics and venous thrombosis were followed for this patient.  IV antibiotics were infused prior to surgery and will be discontinued within 24 hours of completion of the surgical procedure.         DESCRIPTION OF PROCEDURE:     The patient was seen in preoperative area where their surgical site was marked. Preoperative antibiotics were received. H&P and consent updated. They were taken to the Operating Room and provided general anesthesia on the hospital bed.  The patient was then transferred to the hand operating table in the supine position.  Traction boots were applied.  The perineal post was placed.  All bony prominences were well-padded.  At this point the extremity is prepped and draped in usual sterile fashion using alcohol followed by ChloraPrep.  Next a formal surgical timeout was carried out.  All members of the team were in agreement.  At this point the anterior superior iliac spine was marked out and an incision extending from about to 2 cm lateral and 1 cm distal to the anterior superior iliac spine was extended in the longitudinal fashion through the skin and subcutaneous tissue with a 10 blade scalpel.  The fascia overlying the tensor fascia alyssa muscle belly was identified and sharply incised.  The tensor fascia was reflected  laterally and an Celia-Francia retractor was placed.  Next the myofascia of the rectus femoris was incised using the Bovie and the rectus was reflected medially.  The reflected head of the rectus was partially released.  Next the floor of the rectus femoris fascia was incised the ascending branch lateral femoral circumflex vessels were identified and ligated using 2-0 silk suture followed by Bovie electrocautery.  Next the pericapsular fat pad was incised and the iliocapsularis was reflected medially.  The superior and inferior extracapsular retractors were placed.  Next Bovie electrocautery was used to incise the capsule making an L-shaped flap.  The capsule was very thin and patulous.  The capsule was then tagged with a 0 Vicryl suture.  The retractors were placed intracapsularly.  The intertrochanteric tubercle was identified and helped to identify the level of the femoral neck osteotomy compared with preoperative templating.  Next a sagittal saw was used to make the osteotomy in the femoral neck.  The extremity was placed on traction and gentle external rotation. The corkscrew was used to remove the femoral head.  There was some difficulty initially removing the femoral head secondary to the large osteophytes.  The anterior superior head osteophytes were removed to facilitate removal of the femoral head.  The femoral head was measured on the back table.  Next the traction was taken off the pubofemoral ligament was identified and released off the proximal femur down to the level of the lesser trochanter. The femur was then rotated externally to 90° and the remnant of the pubofemoral  ligament was released.  Next the mini Charnley retractor was placed intracapsularly to expose the acetabulum.  10 blade scalpel was used to remove the labral remnant.  The Bovie electrocautery was used to remove the ligament of teres and pulvinar.  Next c arm imaging was used to make an AP pelvis as well as AP hip.  The acetabulum  was sequentially reamed up to a 1 size smaller than the definitive implant.  I did end up reaming with a 52 mm reamer to help reamed through the superior sclerotic bone.  I then reamed up to a size 55 for a 56 mm implant.  The implant was impacted receiving excellent purchase.  2 screws were placed.  A definitive polyethylene liner was placed.  The acetabular component did have coverage anteriorly.  Overhanging inferior osteophytes were removed.  Final imaging of the acetabular component was assessed and determined to be appropriate inclination as well as version.  Next, attention was directed towards the femur.  The femoral lift was placed lateral to the vastus lateralis.  The femur was placed in 90° of external rotation.  A longitudinal incision was made in the posterior capsule and the ischiofemoral ligament.  This was incised to the level of the fat pad between the capsule and the gluteus medius and minimus.  A trochanteric retractor was placed here.  Next further release was performed reflecting the piriformis posteriorly over the trochanter.  The obturator externus was maintained as was the piriformis.  The conjoined tendon was reflected over the greater trochanter, maintaining its insertion.  The femur was delivered through the capsular rent and dropped to the floor. The femur was placed in external rotation.  The box osteotome was used to open the femur followed by a rasp.  The femur was then sequentially broached up to a the size of the definitive implant,  achieving excellent fixation.  A trial neck and head was placed in accordance with preoperative templating and intraoperative imaging.  The hip was reduced and had excellent stability at 90° with lateral traction with the bone.  C arm images were again obtained and overlay technique was utilized to compare the operative extremity to the contralateral side in regards to leg length as well as offset.  The hip was dislocated again and definitive components  were seleted and impacted into placed.  This was after I broached up one additional size on the femoral component.  The femoral component had excellent purchase and fixation.  It was lateralized as much as possible.  There was no evidence of intraoperative fracture.  The hip was again imaged to confirm stem position. This remained stable with 90 degrees of external rotation and lateral traction with the bone hook.  Next the wound was irrigated copiously sterile saline via pulsatile saline lavage.  Periarticular injection was placed in the iliocapsularis as well as rectus femoris and tensor fascia alyssa muscle bellies.  The capsule was not closed however was laid back over the anterior aspect of the neck.  Next the fascia overlying the tensor fascia alyssa was closed using 0 Vicryl suture in running fashion.  Careful attention was paid towards avoiding the lateral femoral cutaneous nerve during the closure.  Subcutaneous layer was closed in 2-0 Vicryl suture interrupted inverted fashion followed by 3-0 Monocryl for the subcuticular layer.  Dermabond and a sterile occlusive dressing were applied.  All counts were correct at the end of the procedure.  The patient was awakened in satisfactory condition and transferred to the postoperative care unit.    Postoperative Plan:  Protocols for intravenous antibiotics and venous thrombosis were followed for this patient.  IV antibiotics were infused prior to surgery and will be discontinued within 24 hours of completion of the surgical procedure.   The patient will receive  Aspirin for DVT prophylaxis for 30 days post operatively  The patient will be weight bearing as tolerated with no hip precautions.       Isaias Crisostomo MD

## 2019-10-16 NOTE — THERAPY EVALUATION
Acute Care - Physical Therapy Initial Evaluation  [facility]     Patient Name: Anthony Vega  : 1965  MRN: [id number]  Today's Date: 10/16/2019                Admit Date: 10/16/2019    Visit Dx: No diagnosis found.  Patient Active Problem List   Diagnosis   • H/O total hip arthroplasty     Past Medical History:   Diagnosis Date   • Arthritis     R.A.-SEES DR GRUBBS   • Hypertension    • Sleep apnea     USES C-PAP     Past Surgical History:   Procedure Laterality Date   • HERNIA REPAIR Bilateral     X 2-DR MERAZ   • TONSILLECTOMY      CHILDHOOD        PT ASSESSMENT (last 12 hours)      Physical Therapy Evaluation    No documentation.       Physical Therapy Education     Title: PT OT SLP Therapies (Done)     Topic: Physical Therapy (Done)     Point: Mobility training (Done)     Learning Progress Summary           Patient Acceptance, E,TB,D, VU,NR by  at 10/16/2019  3:53 PM   Significant Other Acceptance, E,TB,D, VU,NR by  at 10/16/2019  3:53 PM                   Point: Home exercise program (Done)     Learning Progress Summary           Patient Acceptance, E,TB,D, VU,NR by  at 10/16/2019  3:53 PM   Significant Other Acceptance, E,TB,D, VU,NR by  at 10/16/2019  3:53 PM                   Point: Precautions (Done)     Learning Progress Summary           Patient Acceptance, E,TB,D, VU,NR by  at 10/16/2019  3:53 PM   Significant Other Acceptance, E,TB,D, VU,NR by  at 10/16/2019  3:53 PM                               User Key     Initials Effective Dates Name Provider Type Discipline     18 -  Valerie Alba, PT Physical Therapist PT              PT Recommendation and Plan  Anticipated Discharge Disposition (PT): home with assist, home with home health  Therapy Frequency (PT Clinical Impression): 2 times/day  Outcome Summary/Treatment Plan (PT)  Anticipated Discharge Disposition (PT): home with assist, home with home health  Plan of Care Reviewed With: patient  Outcome Summary: Pt  presents s/p anterior approach right MEENAKSHI. Pt will likely benefit from cont skilled PT for progressin toward sup level of assistance in preparation to return home with HHPT. Pt was able to perform sup to sit , sit to stand and amb with rwxcga for 80'. Pt required cues for safe usage of rwx . Pt has 2 steps into home without railing. He     Time Calculation:   PT Charges     Row Name 10/16/19 1556             Time Calculation    Start Time  1525  -SV      Stop Time  1554  -SV      Time Calculation (min)  29 min  -SV      PT Received On  10/16/19  -SV      PT - Next Appointment  10/17/19  -SV      PT Goal Re-Cert Due Date  10/23/19  -SV        User Key  (r) = Recorded By, (t) = Taken By, (c) = Cosigned By    Initials Name Provider Type    SV Valerie Alba, PT Physical Therapist        Therapy Charges for Today     Code Description Service Date Service Provider Modifiers Qty    89716346843 HC PT EVAL MOD COMPLEXITY 2 10/16/2019 Valerie Alba, PT GP 1    54548817208 HC PT THER PROC EA 15 MIN 10/16/2019 Valerie Alba, PT GP 1          PT G-Codes  Outcome Measure Options: AM-PAC 6 Clicks Basic Mobility (PT)  AM-PAC 6 Clicks Score (PT): 18      Valeire Alba PT  10/16/2019

## 2019-10-16 NOTE — ANESTHESIA PROCEDURE NOTES
Airway  Urgency: elective    Date/Time: 10/16/2019 10:16 AM  Airway not difficult    General Information and Staff    Patient location during procedure: OR  Anesthesiologist: Melody Cedeno MD  CRNA: Sabrina Riggins CRNA    Indications and Patient Condition  Indications for airway management: airway protection    Preoxygenated: yes (pt pre-O2 with 100% O2)  Mask difficulty assessment: 2 - vent by mask + OA or adjuvant +/- NMBA    Final Airway Details  Final airway type: endotracheal airway      Successful airway: ETT  Cuffed: yes   Successful intubation technique: direct laryngoscopy  Facilitating devices/methods: anterior pressure/BURP  Endotracheal tube insertion site: oral  Blade: Minna  Blade size: 4  ETT size (mm): 7.5  Cormack-Lehane Classification: grade IIb - view of arytenoids or posterior of glottis only  Placement verified by: chest auscultation and capnometry   Cuff volume (mL): 7  Measured from: lips  ETT/EBT  to lips (cm): 23  Number of attempts at approach: 1    Additional Comments  ATOETx1. No change in dentition. Lower partials removed after intubation and placed in a denture cup.

## 2019-10-16 NOTE — PLAN OF CARE
Problem: Patient Care Overview  Goal: Plan of Care Review  Outcome: Ongoing (interventions implemented as appropriate)   10/16/19 5201   Coping/Psychosocial   Plan of Care Reviewed With patient   OTHER   Outcome Summary RTH (anterior) today, arrived to floor 1405, VSS, RA, A&O, assist x1, DTV 2030, walked in [place] w/ PT, mepilex dressing c/d/i, educated on bp monitoring, plans for home w/HH at d/c   Plan of Care Review   Progress improving

## 2019-10-16 NOTE — ANESTHESIA POSTPROCEDURE EVALUATION
Patient: Anthony Vega    Procedure Summary     Date:  10/16/19 Room / Location:  [facility] OR [address] [place] [address] MAIN OR    Anesthesia Start:  1007 Anesthesia Stop:  1231    Procedure:  TOTAL HIP ARTHROPLASTY ANTERIOR WITH HANA TABLE (Right Hip) Diagnosis:      Surgeon:  Isaias Crisostomo MD Provider:  Melody Cedeno MD    Anesthesia Type:  general ASA Status:  2          Anesthesia Type: general  Last vitals  BP   107/64 (10/16/19 1245)   Temp   36.8 °C (98.3 °F) (10/16/19 1228)   Pulse   68 (10/16/19 1245)   Resp   20 (10/16/19 1245)     SpO2   94 % (10/16/19 1245)     Post Anesthesia Care and Evaluation    Patient location during evaluation: PACU  Patient participation: complete - patient participated  Level of consciousness: awake and alert  Pain management: adequate  Airway patency: patent  Anesthetic complications: No anesthetic complications    Cardiovascular status: acceptable  Respiratory status: acceptable  Hydration status: acceptable    Comments: --------------------            10/16/19               1245     --------------------   BP:       107/64     Pulse:      68       Resp:       20       Temp:                SpO2:      94%      --------------------

## 2019-10-16 NOTE — DISCHARGE INSTRUCTIONS
Dr. Isaias Crisostomo Total Hip Joint Replacement Discharge Instructions:  Office Phone Number: [phone]    I. ACTIVITIES:  1. Exercises:  ? Complete exercise program as taught by the hospital physical therapist 2 times per day.  You may wean off the walker to a cane when directed by the physical therapist.  ? Exercise program will be advanced by the physical therapist  ? During the day be up ambulating every 2 hours (while awake) for short distances  ? Complete the ankle pump exercises at least 10 times per hour (while awake)  ? Elevate legs most of the day the first week post operatively and thereafter elevate legs when in bed and for at least 30 minutes during the day. Use cold packs 20-30 minutes approximately 5 times per day. This should be done before and after completing your exercises and at any time you are experiencing pain/ stiffness in your operative extremity.      2. Activities of Daily Living:  ? No tub baths, hot tubs, or swimming pools for 4 weeks  ? The clear dressing with thin white gauze strip dressing is waterproof.  You may shower without covering the dressing beginning 3 days after the operation.  After 7 days you may remove the dressing.  If the dressing becomes saturated prior to day 7, it may be changed.  After dressing removal, do not scrub or rub the incision. Allow skin glue to fall off over the next few weeks.  After the dressing is removed, simply let the water run over the incision and pat dry.    II. Restrictions  ? Follow any movement restrictions that was discussed with you by either Dr. Crisostomo or the physical therapist.     ? Dr. Crisostomo will discuss with you when you will be able to drive again at your first post-op appointment.  ? Weight bearing is as tolerated.  ? First week stay inside on even terrain. May go up and down stairs one stair at a time utilizing the hand rail.  ? Once you feel confident, you may venture outside.    III. Precautions:  ? Everyone that comes near  you should wash their hands  ? No elective dental, genital-urinary, or colon procedures or surgical procedures for 12 weeks after surgery unless absolutely necessary.  ?  If dental work or surgical procedure is deemed absolutely necessary within 12 weeks of surgery, you will need to contact Dr. Crisostomo's office as you will need to take antibiotics 1 hour prior to any dental work (including teeth cleanings).  ? Dr. Crisostomo will prescribe prophylactic antibiotics for all dental procedures for one year  as a precautionary measure to minimize risk of infection.  If you are a diabetic or take immunosuppressive medication, you may have to take prophylactic antibiotics the remainder of your life before dental work.    ? Avoid sick people. If you must be around someone who is ill, they should wear a mask.  ? Avoid visits to the Emergency Room or Urgent Care unless you are having a life threatening event.   ? If you have leg swelling you may wear leg compression stocking.   Stockings are to be placed on in the morning and removed at night. Monitor the stockings to ensure that any swelling is not causing the stockings to become too tight. In this case, remove stockings immediately.    IV. INCISION CARE:  ? Dr. Crisostomo takes great care in closing your incision to give you the best opportunity for a healthy incision with minimal scarring. He places sutures below the skin surface that will eventually dissolve.  The incision is then covered with a skin glue which makes the incision water tight, and minimizes bleeding onto the dressing.  No staples are used.  Occasionally one of the buried stitches may come to the skin surface and may need to be removed.  Please resist the temptation of removing the stitch by yourself.   will be happy to remove it for you.  Bruising around the incision and thigh is normal and to be expected.  Please keep dressing in place at least until post-op day 7. You may remove and replace dressing  before day 7 if the dressing begins to fall off or becomes saturated. Wash your hands and under your finger nails prior to dressing changes.  After day 7 as long as incision is dry and intact, you may leave the dressing off and open to air.  You will need to find underwear that does not rub on the incision for a few weeks after the surgery.  You may find it more comfortable to place a dressing on to keep your underwear from rubbing the incision.       ? If dressing must be changed, utilize dry gauze and paper tape. Avoid touching the side of the gauze that goes against the incision with your hands.  ? No creams or ointments to the incision until permission given by Dr. Crisostomo.  ? Do not touch or pick at the incision, or try to remove any sutures or skin glue.  ? Check dressing every day and notify surgeon immediately if any of the following signs or symptoms are noted:  o Increase in redness  o Increase in swelling of the entire extremity that does not go away with elevation.  Notify office that you may have a blood clot.    o Drainage oozing from the incision  o Pulling apart of the edges of the incision  o Increase in overall body temperature (greater than 100.5 degrees)    V. Medications:   1. Anticoagulants: You will be discharged on an anticoagulant. This is a prophylactic medication that helps prevent blood clots during your post-operative period. The type and length of dosage varies based on your individual needs, procedure performed, and Dr. Crisostomo's preference.  ? While taking the anticoagulant, you should avoid taking any additional aspirin than what is prescribed.   ? Notify surgeon immediately if any rogelio bleeding is noted in the urine, stool, emesis, or from the nose or the incision. Blood in the stool will often appear as black rather than red. Blood in urine may appear as pink. Blood in emesis may appear as brown/black like coffee grounds.  ? You will need to apply pressure for longer periods of time  to any cuts or abrasions to stop bleeding  ? Avoid alcohol while taking anticoagulants.    2. Stool Softeners: You will be at greater risk of constipation after surgery due to being less mobile and the pain medications.   ? Take stool softeners as instructed by your surgeon while on pain medications. Over the counter Colace 100 mg 1-2 capsules twice daily.   ? If stools become too loose or too frequent, please decreases the dosage or stop the stool softener.  ? If constipation occurs despite use of stool softeners, you are to continue the stool softeners and add a laxative (Milk of Magnesia 1 ounce daily as needed).  If no bowel movement occurs past 3 days, then purchase Magnesium citrate and drink 1/2 bottle every 8 hours (on ice tastes better) until success. If no bowel movement by post-operative day 5 please call Dr. Maldonado office for further instructions.   You may need to decrease or stop your pain medications if bowel movements to not occur.     ? Drink plenty of fluids, and eat fruits and vegetables during your recovery time.    3. Pain Medications utilized after surgery are narcotics and the law requires that the following information be given to all patients that are prescribed narcotics:  ? CLASSIFICATION: Pain medications are called Opioids and are narcotics  ? LEGALITIES: It is illegal to share narcotics with others and to drive within 24 hours of taking narcotics  ? POTENTIAL SIDE EFFECTS: Potential side effects of opioids include: nausea, vomiting, itching, dizziness, drowsiness, dry mouth, constipation, and difficulty urinating.  ? POTENTIAL ADVERSE EFFECTS:   o Opioid tolerance can develop with use of pain medications and this simply means that it requires more and more of the medication to control pain; however, this is seen more in patients that use opioids for longer periods of time.  o Opioid dependence can develop with use of Opioids and this simply means that to stop the medication can cause  "withdrawal symptoms; however, this is seen with patients that use Opioids for longer periods of time.  o Opioid addiction can develop with use of Opioids and the incidence of this is very unlikely in patients who take the medications as ordered and stop the medications as instructed.  o Opioid overdose can be dangerous, but is unlikely when the medication is taken as ordered and stopped when ordered. It is important not to mix opioids with alcohol or with and type of sedative such as Benadryl as this can lead to over sedation and respiratory difficulty.  ? DOSAGE:   o Pain medications will need to be taken consistently for the first few days to decrease pain and promote adequate pain relief and participation in physical therapy.  o After the initial surgical pain begins to resolve, you may begin to decrease the pain medication. By the end of 6 weeks, you should be off of pain medications except for before physical therapy or to help with pain when attempting to fall asleep.  Pain medications will be tapered to lesser dosages as you are further from your surgical day.  No pain medications will be provided after 3 months from surgery.     o Refills will not be given by the office during evening hours, or weekends.  o To seek refills on pain medications during the post-operative period, you must call the office 48 hours in advance to request the refill. The office will then notify you when to  the prescription. DO NOT wait until you are out of the medication to request a refill.  They can not be \"called in\" to the pharmacy.      How to Wean Off Pain Medication:   As you begin to feel better, gradually wean off the narcotic pain medication and begin to use it only for breakthrough pain.  · Gradually reduce the total number of pills you take each day.  This can be done by taking fewer pills at a time or by increasing the amount of time between each pill.    · For example, if you were taking 2 pills every 6 hours " you would be taking a total of 8 pills per day.  Reduce this to 6 pills per day, then 4-5 and so on.  This can be done by taking 1 pill at a time instead of 2, or by taking the pills every 8 hours instead of every 6.    · As you begin to wean from the narcotic pain medication, begin substituting with over the counter tylenol when you are not taking the narcotic.  Limit total tylenol dosage to less than 4 grams per day.    V. FOLLOW-UP VISITS:  ? You will need to follow up in the office with Dr. Crisostomo at 3 weeks.  Please call [phone] if you need to confirm or reschedule your appointment time.   ? If you have any concerns or suspected complications prior to your follow up visit, please call your surgeons office. Do not wait until your appointment time if you suspect complications. These will need to be addressed in the office promptly.

## 2019-10-16 NOTE — DISCHARGE PLACEMENT REQUEST
"Anthony Cota (54 y.o. Male)     Date of Birth Social Security Number Address Home Phone MRN    1965  [address] [facility] [facility] [address] [phone] [id number]    [employer] Marital Status          Religious        Admission Date Admission Type Admitting Provider Attending Provider Department, Room/Bed    10/16/19 Elective Isaias Crisostomo MD Keller, Tyler, MD [address], [address]    Discharge Date Discharge Disposition Discharge Destination                       Attending Provider:  Isaias Crisostomo MD    Allergies:  Codeine    Isolation:  None   Infection:  None   Code Status:  CPR    Ht:  177.8 cm (70\")   Wt:  89.1 kg (196 lb 7 oz)    Admission Cmt:  None   Principal Problem:  None                Active Insurance as of 10/16/2019     Primary Coverage     Payor Plan Insurance Group Employer/Plan Group    [employer] BLUE [employer]OSS [employer] BLUE [employer]OSS BLUE [employer]IELD PPO [id number]     Payor Plan Address Payor Plan Phone Number Payor Plan Fax Number Effective Dates    [address] [phone]  6/26/2019 - None Entered    [facility] [id number]       Subscriber Name Subscriber Birth Date Member ID       ANTHONY COTA 1965 [id number]                 Emergency Contacts      (Rel.) Home Phone Work Phone Mobile Phone    CIPRIANOAMANDA GAMINO [phone] -- [phone]              "

## 2019-10-16 NOTE — PLAN OF CARE
Problem: Patient Care Overview  Goal: Plan of Care Review  Outcome: Ongoing (interventions implemented as appropriate)   10/16/19 5164   Coping/Psychosocial   Plan of Care Reviewed With patient   OTHER   Outcome Summary Pt presents s/p anterior approach right [id number]. Pt will likely benefit from cont skilled PT for progressin toward sup level of assistance in preparation to return home with HHPT. Pt was able to perform sup to sit , sit to stand and amb with rwxcga for 80'. Pt required cues for safe usage of rwx . Pt has 2 steps into home without railing. He needs a bsc but has borrowed a rwx.

## 2019-10-17 VITALS
OXYGEN SATURATION: 99 % | SYSTOLIC BLOOD PRESSURE: 123 MMHG | BODY MASS INDEX: 28.12 KG/M2 | HEART RATE: 66 BPM | DIASTOLIC BLOOD PRESSURE: 86 MMHG | WEIGHT: 196.44 LBS | RESPIRATION RATE: 16 BRPM | HEIGHT: 70 IN | TEMPERATURE: 97.2 F

## 2019-10-17 LAB
ANION GAP SERPL CALCULATED.3IONS-SCNC: 8.6 MMOL/L (ref 5–15)
BUN BLD-MCNC: 10 MG/DL (ref 6–20)
BUN/CREAT SERPL: 12.7 (ref 7–25)
CALCIUM SPEC-SCNC: 8.7 MG/DL (ref 8.6–10.5)
CHLORIDE SERPL-SCNC: 102 MMOL/L (ref 98–107)
CO2 SERPL-SCNC: 29.4 MMOL/L (ref 22–29)
CREAT BLD-MCNC: 0.79 MG/DL (ref 0.76–1.27)
GFR SERPL CREATININE-BSD FRML MDRD: 102 ML/MIN/1.73
GLUCOSE BLD-MCNC: 110 MG/DL (ref 65–99)
HCT VFR BLD AUTO: 36.8 % (ref 37.5–51)
HGB BLD-MCNC: 12 G/DL (ref 13–17.7)
POTASSIUM BLD-SCNC: 3.9 MMOL/L (ref 3.5–5.2)
SODIUM BLD-SCNC: 140 MMOL/L (ref 136–145)

## 2019-10-17 PROCEDURE — 85014 HEMATOCRIT: CPT | Performed by: ORTHOPAEDIC SURGERY

## 2019-10-17 PROCEDURE — 97150 GROUP THERAPEUTIC PROCEDURES: CPT

## 2019-10-17 PROCEDURE — G0378 HOSPITAL OBSERVATION PER HR: HCPCS

## 2019-10-17 PROCEDURE — 85018 HEMOGLOBIN: CPT | Performed by: ORTHOPAEDIC SURGERY

## 2019-10-17 PROCEDURE — 80048 BASIC METABOLIC PNL TOTAL CA: CPT | Performed by: ORTHOPAEDIC SURGERY

## 2019-10-17 PROCEDURE — 25010000003 CEFAZOLIN IN DEXTROSE 2-4 GM/100ML-% SOLUTION: Performed by: ORTHOPAEDIC SURGERY

## 2019-10-17 PROCEDURE — 25010000002 KETOROLAC TROMETHAMINE PER 15 MG: Performed by: ORTHOPAEDIC SURGERY

## 2019-10-17 PROCEDURE — 97110 THERAPEUTIC EXERCISES: CPT

## 2019-10-17 RX ORDER — TRAMADOL HYDROCHLORIDE 50 MG/1
50 TABLET ORAL EVERY 8 HOURS PRN
Qty: 45 TABLET | Refills: 0 | Status: SHIPPED | OUTPATIENT
Start: 2019-10-17 | End: 2019-11-01

## 2019-10-17 RX ORDER — HYDROCODONE BITARTRATE AND ACETAMINOPHEN 7.5; 325 MG/1; MG/1
1-2 TABLET ORAL EVERY 6 HOURS PRN
Qty: 56 TABLET | Refills: 0 | Status: SHIPPED | OUTPATIENT
Start: 2019-10-17 | End: 2019-10-26

## 2019-10-17 RX ADMIN — KETOROLAC TROMETHAMINE 15 MG: 30 INJECTION, SOLUTION INTRAMUSCULAR at 06:06

## 2019-10-17 RX ADMIN — SODIUM CHLORIDE, PRESERVATIVE FREE 3 ML: 5 INJECTION INTRAVENOUS at 08:52

## 2019-10-17 RX ADMIN — CEFAZOLIN SODIUM 2 G: 2 INJECTION, SOLUTION INTRAVENOUS at 01:41

## 2019-10-17 RX ADMIN — HYDROCODONE BITARTRATE AND ACETAMINOPHEN 2 TABLET: 7.5; 325 TABLET ORAL at 08:52

## 2019-10-17 RX ADMIN — HYDROCODONE BITARTRATE AND ACETAMINOPHEN 2 TABLET: 7.5; 325 TABLET ORAL at 00:58

## 2019-10-17 RX ADMIN — ASPIRIN 325 MG: 325 TABLET, DELAYED RELEASE ORAL at 08:52

## 2019-10-17 RX ADMIN — HYDROCODONE BITARTRATE AND ACETAMINOPHEN 2 TABLET: 7.5; 325 TABLET ORAL at 05:02

## 2019-10-17 NOTE — PLAN OF CARE
Problem: Patient Care Overview  Goal: Plan of Care Review  Outcome: Ongoing (interventions implemented as appropriate)   10/17/19 0918   Coping/Psychosocial   Plan of Care Reviewed With patient   OTHER   Outcome Summary Pt POD 1 of right total hip. Pain controlled at this time. Voiding without difficulty. VSS. NVI. Dressing clean dry and intact. Plans to d/c home today. Will continue to monitor.    Plan of Care Review   Progress improving     Goal: Individualization and Mutuality  Outcome: Ongoing (interventions implemented as appropriate)    Goal: Discharge Needs Assessment  Outcome: Ongoing (interventions implemented as appropriate)    Goal: Interprofessional Rounds/Family Conf  Outcome: Ongoing (interventions implemented as appropriate)      Problem: Hip Arthroplasty (Total, Partial) (Adult)  Goal: Signs and Symptoms of Listed Potential Problems Will be Absent, Minimized or Managed (Hip Arthroplasty)  Outcome: Ongoing (interventions implemented as appropriate)    Goal: Anesthesia/Sedation Recovery  Outcome: Outcome(s) achieved Date Met: 10/17/19      Problem: Fall Risk (Adult)  Goal: Identify Related Risk Factors and Signs and Symptoms  Outcome: Ongoing (interventions implemented as appropriate)

## 2019-10-17 NOTE — PAYOR COMM NOTE
"UR CONTACT:  HUNTER  P: [phone]        F: [phone]  INITIAL CLINICAL      Anthony Cota (54 y.o. Male)     Date of Birth Social Security Number Address Home Phone MRN    1965  [address] [phone] [id number]    [employer] Mar[employer] Status          Presybeterian        Admission Date Admission Type Admitting Provider Attending Provider Department, Room/Bed    10/16/19 Elective Isaias Crisostomo MD Keller, Tyler, MD [address], P799/1    Discharge Date Discharge Disposition Discharge Destination         Home or Self Care              Attending Provider:  Isaias Crisostomo MD    Allergies:  Codeine    Isolation:  None   Infection:  None   Code Status:  CPR    Ht:  177.8 cm (70\")   Wt:  89.1 kg (196 lb 7 oz)    Admission Cmt:  None   Principal Problem:  None                Active Insurance as of 10/16/2019     Primary Coverage     Payor Plan Insurance Group Employer/Plan Group    [employer]HEM BLUE CROSS ANTHEM BLUE CROSS BLUE SHIELD PPO [id number]     Payor Plan Address Payor Plan Phone Number Payor Plan Fax Number Effective Dates    [address] [phone]  6/26/2019 - None Entered    [place] [address]       Subscriber Name Subscriber Birth Date Member ID       ANTHONY COTA 1965 [id number]                 Emergency Contacts      (Rel.) Home Phone Work Phone Mobile Phone    AMANDA COTA [phone] -- [phone]               History & Physical      Isaias Crisostomo MD at 10/16/19 0910        H&P reviewed. The patient was examined and there are no changes to the H&P.    Electronically signed by Isaias Crisostomo MD at 10/16/19 0910   Source Note         Scan on 10/16/2019: 09/27/2019 (below)            Electronically signed by Interface, Scans Incoming at 09/27/19 1531             H&P filed by New Onbase, [place] at 09/27/19 1531     Scan on 10/16/2019: 09/27/2019 (below)            Electronically signed by Interface, Scans Incoming " "at 09/27/19 1531             [facility] Medications (active)       Dose Frequency Start End    acetaminophen (TYLENOL) tablet 325 mg 325 mg Every 4 Hours PRN 10/16/2019     Sig - Route: Take 1 tablet by mouth Every 4 (Four) Hours As Needed for Fever (greater than 101 F). - Oral    aspirin EC tablet 325 mg 325 mg Every 12 Hours Scheduled 10/16/2019     Sig - Route: Take 1 tablet by mouth Every 12 (Twelve) Hours. - Oral    bisacodyl (DULCOLAX) EC tablet 10 mg 10 mg Daily PRN 10/16/2019     Sig - Route: Take 2 tablets by mouth Daily As Needed for Constipation. - Oral    ceFAZolin in dextrose (ANCEF) IVPB solution 2 g 2 g Every 8 Hours 10/16/2019 10/17/2019    Sig - Route: Infuse 100 mL into a venous catheter Every 8 (Eight) Hours. - Intravenous    docusate sodium (COLACE) capsule 100 mg 100 mg 2 Times Daily PRN 10/16/2019     Sig - Route: Take 1 capsule by mouth 2 (Two) Times a Day As Needed for Constipation. - Oral    HYDROcodone-acetaminophen (NORCO) 7.5-325 MG per tablet 1 tablet 1 tablet Every 4 Hours PRN 10/16/2019 10/26/2019    Sig - Route: Take 1 tablet by mouth Every 4 (Four) Hours As Needed for Moderate Pain . - Oral    HYDROcodone-acetaminophen (NORCO) 7.5-325 MG per tablet 2 tablet 2 tablet Every 4 Hours PRN 10/16/2019 10/26/2019    Sig - Route: Take 2 tablets by mouth Every 4 (Four) Hours As Needed for Severe Pain . - Oral    HYDROmorphone (DILAUDID) injection 0.5 mg 0.5 mg Every 2 Hours PRN 10/16/2019 10/26/2019    Sig - Route: Infuse 0.5 mL into a venous catheter Every 2 (Two) Hours As Needed for Severe Pain . - Intravenous    Linked Group 1:  \"And\" Linked Group Details        influenza vac split quad (FLUZONE,FLUARIX,AFLURIA) injection 0.5 mL 0.5 mL Once 10/17/2019     Sig - Route: Inject 0.5 mL into the appropriate muscle as directed by prescriber 1 (One) Time. - Intramuscular    Cosign for Ordering: Accepted by Isaias Crisostomo MD on 10/16/2019  3:12 PM    ketorolac (TORADOL) injection 15 mg 15 mg Every 6 " "Hours 10/16/2019 10/17/2019    Sig - Route: Infuse 0.5 mL into a venous catheter Every 6 (Six) Hours. - Intravenous    lactated ringers infusion 125 mL/hr Continuous 10/16/2019     Sig - Route: Infuse 125 mL/hr into a venous catheter Continuous. - Intravenous    magnesium hydroxide (MILK OF MAGNESIA) suspension 2400 mg/10mL 10 mL 10 mL Daily PRN 10/16/2019     Sig - Route: Take 10 mL by mouth Daily As Needed for Constipation. - Oral    naloxone (NARCAN) injection 0.1 mg 0.1 mg Every 5 Minutes PRN 10/16/2019     Sig - Route: Infuse 0.25 mL into a venous catheter Every 5 (Five) Minutes As Needed for Respiratory Depression. - Intravenous    Linked Group 1:  \"And\" Linked Group Details        ondansetron (ZOFRAN) injection 4 mg 4 mg Every 6 Hours PRN 10/16/2019     Sig - Route: Infuse 2 mL into a venous catheter Every 6 (Six) Hours As Needed for Nausea or Vomiting. - Intravenous    sodium chloride 0.9 % flush 3 mL 3 mL Every 12 Hours Scheduled 10/16/2019     Sig - Route: Infuse 3 mL into a venous catheter Every 12 (Twelve) Hours. - Intravenous    sodium chloride 0.9 % flush 3-10 mL 3-10 mL As Needed 10/16/2019     Sig - Route: Infuse 3-10 mL into a venous catheter As Needed for Line Care. - Intravenous    traMADol (ULTRAM) tablet 50 mg 50 mg Every 8 Hours PRN 10/16/2019 10/26/2019    Sig - Route: Take 1 tablet by mouth Every 8 (Eight) Hours As Needed for Moderate Pain . - Oral       Orders (active)     Start     Ordered    10/17/19 1600  Vital Signs  Every 8 Hours      10/16/19 1409    10/17/19 1600  Neurovascular Checks  Every 8 Hours      10/16/19 1409    10/17/19 1200  influenza vac split quad (FLUZONE,FLUARIX,AFLURIA) injection 0.5 mL  Once      10/16/19 1420    10/17/19 0800  Up in Chair 3x / Day - Beginning POD 1  3 Times Daily      10/16/19 1409    10/17/19 0800  Ambulate in [place] 4x / Day Beginning POD 1  4 Times Daily      10/16/19 1409    10/17/19 0000  aspirin 325 MG EC tablet  Every 12 Hours Scheduled      " 10/17/19 0749    10/17/19 0000  HYDROcodone-acetaminophen (NORCO) 7.5-325 MG per tablet  Every 6 Hours PRN      10/17/19 0749    10/17/19 0000  traMADol (ULTRAM) 50 MG tablet  Every 8 Hours PRN      10/17/19 0749    10/17/19 0000  Commode Chair      10/17/19 0914    10/16/19 1800  aspirin EC tablet 325 mg  Every 12 Hours Scheduled      10/16/19 1409    10/16/19 1600  Turn cough deep breathe every 2 hours until ambulatory.  Every 2 Hours      10/16/19 1409    10/16/19 1600  Vital Signs  Every 4 Hours      10/16/19 1409    10/16/19 1600  Neurovascular Checks  Every 4 Hours      10/16/19 1409    10/16/19 1600  Incentive Spirometry  Every 2 Hours While Awake     Comments:  Until lungs are clear and no productive cough.    10/16/19 1409    10/16/19 1411  sodium chloride 0.9 % flush 3 mL  Every 12 Hours Scheduled      10/16/19 1409    10/16/19 1411  lactated ringers infusion  Continuous      10/16/19 1409    10/16/19 1411  ketorolac (TORADOL) injection 15 mg  Every 6 Hours      10/16/19 1409    10/16/19 1410  Code Status and Medical Interventions:  Continuous      10/16/19 1409    10/16/19 1410  Weight Bearing - Full Weight Bearing  Continuous      10/16/19 1409    10/16/19 1410  Ambulate in [place] [id number] Day of Surgery  Once      10/16/19 1409    10/16/19 1410  Pillows may be used to elevate the operative leg.  Until Discontinued      10/16/19 1409    10/16/19 1410  Instruct Patient to Do At Least 10 Ankle Pumps Per Hour While Awake  Every Shift     Comments:  DVT prophylaxis    10/16/19 1409    10/16/19 1410  May stand to void or use BSC day of surgery. POD #1 and thereafter use bedside commode or bathroom.  Once      10/16/19 1409    10/16/19 1410  Patient may shower with assistance.  Once      10/16/19 1409    10/16/19 1410  Intake and Output  Every Shift      10/16/19 1409    10/16/19 1410  Saline lock IV with adequate po intake.  Continuous     Comments:  Saline lock IV when tolerating PO intake well, BP is stable and  urinary output is adequate    10/16/19 1409    10/16/19 1410  Continue Indwelling Urinary Catheter Already in Place  Once      10/16/19 1409    10/16/19 1410  Notify Provider if Bladder Distention Continues  Until Discontinued      10/16/19 1409    10/16/19 1410  Catheter Care  Every Shift      10/16/19 1409    10/16/19 1410  Oxygen Therapy-  Continuous     Comments:  Wean O2 to keep saturation above 90%    10/16/19 1409    10/16/19 1410  Advance diet as tolerated  Until Discontinued     Comments:  Strict aspiration precautions. Keep head of bed up with po intake    10/16/19 1409    10/16/19 1410  PT Consult: Eval & Treat  Once      10/16/19 1409    10/16/19 1410  Insert Peripheral IV  Once      10/16/19 1409    10/16/19 1410  Saline Lock & Maintain IV Access  Continuous      10/16/19 1409    10/16/19 1410  Notify physician (specify)  Until Discontinued      10/16/19 1409    10/16/19 1410  Diet Regular  Diet Effective Now      10/16/19 1409    10/16/19 1409  HYDROmorphone (DILAUDID) injection 0.5 mg  Every 2 Hours PRN      10/16/19 1409    10/16/19 1409  naloxone (NARCAN) injection 0.1 mg  Every 5 Minutes PRN      10/16/19 1409    10/16/19 1409  sodium chloride 0.9 % flush 3-10 mL  As Needed      10/16/19 1409    10/16/19 1409  HYDROcodone-acetaminophen (NORCO) 7.5-325 MG per tablet 1 tablet  Every 4 Hours PRN      10/16/19 1409    10/16/19 1409  docusate sodium (COLACE) capsule 100 mg  2 Times Daily PRN      10/16/19 1409    10/16/19 1409  bisacodyl (DULCOLAX) EC tablet 10 mg  Daily PRN      10/16/19 1409    10/16/19 1409  magnesium hydroxide (MILK OF MAGNESIA) suspension 2400 mg/10mL 10 mL  Daily PRN      10/16/19 1409    10/16/19 1409  ondansetron (ZOFRAN) injection 4 mg  Every 6 Hours PRN      10/16/19 1409    10/16/19 1409  acetaminophen (TYLENOL) tablet 325 mg  Every 4 Hours PRN      10/16/19 1409    10/16/19 1246  HYDROcodone-acetaminophen (NORCO) 7.5-325 MG per tablet 2 tablet  Every 4 Hours PRN       10/16/19 1246    10/16/19 1227  traMADol (ULTRAM) tablet 50 mg  Every 8 Hours PRN      10/16/19 1227    10/16/19 1226  Maintain Sequential Compression Device  Continuous      10/16/19 1225    10/16/19 1226  Pulse Oximetry, Continuous  Continuous      10/16/19 1225    10/16/19 1226  NIPPV (CPAP or BIPAP)  As Needed-RT      10/16/19 1225    Unscheduled  Up in Chair x 1 day of surgery, then up in chair x 3 beginning POD #1.  As Needed      10/16/19 1409    Unscheduled  For same day discharge patients, up in chair x2 day of surgery.  As Needed      10/16/19 1409    Unscheduled  Ice to incision for 48 hours, then PRN for edema, after activity or exercise, and to lessen discomfort.  As Needed      10/16/19 1409    Unscheduled  Apply Ice to Incision PRN for Edema, After Activity or Exercise, and to Lessen Discomfort  As Needed      10/16/19 1409    Unscheduled  Bladder Scan if Patient Unable to Void 4-6 Hours After Catheter Removal  As Needed      10/16/19 1409    Unscheduled  Straight Cath Every 4-6 Hours As Needed If Patient is Unable to Void After 4-6 Hours, Bladder Scan Volume is Greater Than 500mL & Patient Has Symptoms of Bladder Discomfort / Distention  As Needed      10/16/19 1409    Unscheduled  Consult Pharmacist For Review of Medications That May Cause Urinary Retention - RN To Place Order for Consult it Needed  As Needed      10/16/19 1409    Unscheduled  Schedule / Prompt Voiding For Patients With Urinary Incontinence  As Needed      10/16/19 1409             Operative/Procedure Notes      Isaias Crisostomo MD at 10/16/19 1037  Version 1 of 1                Right TOTAL HIP ARTHROPLASTY ANTERIOR WITH HANA TABLE  Procedure Note    Anthony Vega  10/16/2019    Pre-op Diagnosis: Right hip osteoarthritis.   Post-op Diagnosis: Same  Procedure:  Right total hip arthroplasty, anterior approach  Surgeon:  Isaias Crisostomo MD  Assistant: Alfred Carlisle CSA  The services of a first assist were necessary for performing the  procedure safely and expeditiously.  The first assist was present for the entire duration of the case and helped with positioning, retraction and closure of the incision.    Anesthesia: General, Anesthesiologist: Melody Cedeno MD  CRNA: Sabrina Riggins CRNA  Staff: Circulator: Brooklynn Jose RN; Spurling, Shannon, RN  Radiology Technologist: Loc Mcclureub Person: Yuki Velez Tara A  Vendor Representative: Manjit Matthews  Orderly: Keith Nair  Assistant: Alfred Carlisle CSA CFA  Estimated Blood Loss: 150 mL  Specimens: * No orders in the log *  Drains: none  Complications: None    Components Utilized:    Implant Name Type Inv. Item Serial No.  Lot No. LRB No. Used   SHLL ACET OSSEOTI G7 4H SZF 56MM - [id number] Implant SHLL ACET OSSEOTI G7 4H SZF 56MM  [employer] [id number] Right 1   SCRW ACET HERMELINDO TRILOGY S/TAP 6.5X30 - [id number] Implant SCRW ACET HERMELINDO TRILOGY S/TAP 6.5X30  [employer] [id number] Right 1   SCRW ACET HERMELINDO TRILOGY S/TAP 6.5X25 - [id number] Implant SCRW ACET HERMELINDO TRILOGY S/TAP 6.5X25  [employer] [id number] Right 1   LINER ACET G7 NEUTRAL E1 SZF 36MM - [id number] Implant LINER ACET G7 NEUTRAL E1 SZF 36MM  [employer] [id number] Right 1   STEM HIP ECHO BI/METRIC MICROPLASTY PROX HI OFFST 130D SZ12 - [id number] Implant STEM HIP ECHO BI/METRIC MICROPLASTY PROX HI OFFST 130D SZ12  [employer] [id number] Right 1   HD FEM BIOLOX DELTA CERAM NK 36MM STD - [id number] Implant HD FEM BIOLOX DELTA CERAM NK 36MM STD  [employer] [id number] Right 1       Indication for Procedure:  This patient is a 54 y.o. male who has failed conservative treatment for management of arthritis of the operative hip.  Surgical options and non-surgical options were discussed in detail and to the patient's satisfaction.  Surgical intervention was recommended based on the patient's injury and functional status.      The risks and benefits of surgery were discussed with patient and  informed consent was obtained.  Risks include but are not limited to, infection, bleeding, nerve injury, blood clots, risks associated with anesthesia, need for further surgery, persistent pain, and possibly death. The risk of leg length discrepancy was also discussed with the patient.    Protocols for intravenous antibiotics and venous thrombosis were followed for this patient.  IV antibiotics were infused prior to surgery and will be discontinued within 24 hours of completion of the surgical procedure.         DESCRIPTION OF PROCEDURE:     The patient was seen in preoperative area where their surgical site was marked. Preoperative antibiotics were received. H&P and consent updated. They were taken to the Operating Room and provided general anesthesia on the hospital bed.  The patient was then transferred to the hand operating table in the supine position.  Traction boots were applied.  The perineal post was placed.  All bony prominences were well-padded.  At this point the extremity is prepped and draped in usual sterile fashion using alcohol followed by ChloraPrep.  Next a formal surgical timeout was carried out.  All members of the team were in agreement.  At this point the anterior superior iliac spine was marked out and an incision extending from about to 2 cm lateral and 1 cm distal to the anterior superior iliac spine was extended in the longitudinal fashion through the skin and subcutaneous tissue with a 10 blade scalpel.  The fascia overlying the tensor fascia alyssa muscle belly was identified and sharply incised.  The tensor fascia was reflected laterally and an Vida retractor was placed.  Next the myofascia of the rectus femoris was incised using the Bovie and the rectus was reflected medially.  The reflected head of the rectus was partially released.  Next the floor of the rectus femoris fascia was incised the ascending branch lateral femoral circumflex vessels were identified and ligated using  2-0 silk suture followed by Bovie electrocautery.  Next the pericapsular fat pad was incised and the iliocapsularis was reflected medially.  The superior and inferior extracapsular retractors were placed.  Next Bovie electrocautery was used to incise the capsule making an L-shaped flap.  The capsule was very thin and patulous.  The capsule was then tagged with a 0 Vicryl suture.  The retractors were placed intracapsularly.  The intertrochanteric tubercle was identified and helped to identify the level of the femoral neck osteotomy compared with preoperative templating.  Next a sagittal saw was used to make the osteotomy in the femoral neck.  The extremity was placed on traction and gentle external rotation. The corkscrew was used to remove the femoral head.  There was some difficulty initially removing the femoral head secondary to the large osteophytes.  The anterior superior head osteophytes were removed to facilitate removal of the femoral head.  The femoral head was measured on the back table.  Next the traction was taken off the pubofemoral ligament was identified and released off the proximal femur down to the level of the lesser trochanter. The femur was then rotated externally to 90° and the remnant of the pubofemoral  ligament was released.  Next the mini Charnley retractor was placed intracapsularly to expose the acetabulum.  10 blade scalpel was used to remove the labral remnant.  The Bovie electrocautery was used to remove the ligament of teres and pulvinar.  Next c arm imaging was used to make an AP pelvis as well as AP hip.  The acetabulum was sequentially reamed up to a 1 size smaller than the definitive implant.  I did end up reaming with a 52 mm reamer to help reamed through the superior sclerotic bone.  I then reamed up to a size 55 for a 56 mm implant.  The implant was impacted receiving excellent purchase.  2 screws were placed.  A definitive polyethylene liner was placed.  The acetabular  component did have coverage anteriorly.  Overhanging inferior osteophytes were removed.  Final imaging of the acetabular component was assessed and determined to be appropriate inclination as well as version.  Next, attention was directed towards the femur.  The femoral lift was placed lateral to the vastus lateralis.  The femur was placed in 90° of external rotation.  A longitudinal incision was made in the posterior capsule and the ischiofemoral ligament.  This was incised to the level of the fat pad between the capsule and the gluteus medius and minimus.  A trochanteric retractor was placed here.  Next further release was performed reflecting the piriformis posteriorly over the trochanter.  The obturator externus was maintained as was the piriformis.  The conjoined tendon was reflected over the greater trochanter, maintaining its insertion.  The femur was delivered through the capsular rent and dropped to the floor. The femur was placed in external rotation.  The box osteotome was used to open the femur followed by a rasp.  The femur was then sequentially broached up to a the size of the definitive implant,  achieving excellent fixation.  A trial neck and head was placed in accordance with preoperative templating and intraoperative imaging.  The hip was reduced and had excellent stability at 90° with lateral traction with the bone.  C arm images were again obtained and overlay technique was utilized to compare the operative extremity to the contralateral side in regards to leg length as well as offset.  The hip was dislocated again and definitive components were seleted and impacted into placed.  This was after I broached up one additional size on the femoral component.  The femoral component had excellent purchase and fixation.  It was lateralized as much as possible.  There was no evidence of intraoperative fracture.  The hip was again imaged to confirm stem position. This remained stable with 90 degrees of  external rotation and lateral traction with the bone hook.  Next the wound was irrigated copiously sterile saline via pulsatile saline lavage.  Periarticular injection was placed in the iliocapsularis as well as rectus femoris and tensor fascia alyssa muscle bellies.  The capsule was not closed however was laid back over the anterior aspect of the neck.  Next the fascia overlying the tensor fascia alyssa was closed using 0 Vicryl suture in running fashion.  Careful attention was paid towards avoiding the lateral femoral cutaneous nerve during the closure.  Subcutaneous layer was closed in 2-0 Vicryl suture interrupted inverted fashion followed by 3-0 Monocryl for the subcuticular layer.  Dermabond and a sterile occlusive dressing were applied.  All counts were correct at the end of the procedure.  The patient was awakened in satisfactory condition and transferred to the postoperative care unit.    Postoperative Plan:  Protocols for intravenous antibiotics and venous thrombosis were followed for this patient.  IV antibiotics were infused prior to surgery and will be discontinued within 24 hours of completion of the surgical procedure.   The patient will receive  Aspirin for DVT prophylaxis for 30 days post operatively  The patient will be weight bearing as tolerated with no hip precautions.       Isaias Crisostomo MD     Electronically signed by Isaias Crisostomo MD at 10/16/19 1217          Physician Progress Notes       Isaias Crisostomo MD at 10/17/19 1052            Isaias Crisostomo MD     Orthopedic Progress Note    Subjective :   Awake and resting comfortably in bed.  Feeling very well.  Denies any chest pain shortness of breath nausea or vomiting.  Has been voiding.    Objective :    Vital signs in last 24 hours:  Temp:  [97 °F (36.1 °C)-98.6 °F (37 °C)] 97 °F (36.1 °C)  Heart Rate:  [53-87] 75  Resp:  [8-20] 16  BP: (100-130)/(60-83) 120/78  Vitals:    10/16/19 1530 10/16/19 1925 10/16/19 2300 10/17/19 0316   BP: 100/61  120/72 118/71 120/78   BP Location: Right arm Left arm Left arm Left arm   Patient Position: Sitting Lying Lying Lying   Pulse: 68 75 64 75   Resp: 16 16 16 16   Temp: 97.6 °F (36.4 °C) 97 °F (36.1 °C) 97.5 °F (36.4 °C) 97 °F (36.1 °C)   TempSrc: Oral Oral Oral Oral   SpO2: 96% 97% 98% 94%   Weight:       Height:           PHYSICAL EXAM:  Patient is calm, in no acute distress, awake and oriented x 3.  right anterior hip dressing is clean, dry and intact.  Sensation is intact to light touch in LFCN distribution  Femoral nerve function intact  Mild swelling to the thigh  Minimal ecchymosis  Sensation is intact to light touch in L1-S1 dermatomes, L1-S1 myotomes intact  Foot is warm and well perfused      LABS:  Results from last 7 days   Lab Units 10/17/19  0533   HEMOGLOBIN g/dL 12.0*   HEMATOCRIT % 36.8*     Results from last 7 days   Lab Units 10/17/19  0533   SODIUM mmol/L 140   POTASSIUM mmol/L 3.9   CHLORIDE mmol/L 102   CO2 mmol/L 29.4*   BUN mg/dL 10   CREATININE mg/dL 0.79   GLUCOSE mg/dL 110*   CALCIUM mg/dL 8.7           Imaging:      ASSESSMENT:  Status post right total hip arthroplasty anterior approach    Plan:  Continue Physical Therapy, weightbearing as tolerated to the right lower extremity no hip precautions.  Keep ice to incision  Continue SCDs, Continue aspirin enteric-coated tablets for DVT prophylaxis.  Dispo planning for discharge to home today after morning physical therapy.    Isaias Crisostomo MD    Date: 10/17/2019  Time: 7:47 AM    Isaias Crisostomo MD  Orthopaedic Surgeon    [facility]  [phone]          Electronically signed by Isaias Crisostomo MD at 10/17/19 9002           Kia Silva RN   Registered Nurse      Plan of Care   Signed   Date of Service:  10/17/19 0952   Creation Time:  10/17/19 0952            Signed           Problem: Patient Care Overview  Goal: Plan of Care Review  Outcome: Ongoing (interventions implemented as appropriate)    10/17/19 8475    Coping/Psychosocial   Plan of Care Reviewed With patient   OTHER   Outcome Summary Pt POD 1 of right total hip. Pain controlled at this time. Voiding without difficulty. VSS. NVI. Dressing clean dry and intact. Plans to d/c home today. Will continue to monitor.                  Donnie Ladd RN   Registered Nurse      Plan of Care   Signed   Date of Service:  10/17/19 0548   Creation Time:  10/17/19 0548            Signed           Problem: Patient Care Overview  Goal: Plan of Care Review  Outcome: Ongoing (interventions implemented as appropriate)    10/17/19 0541   Coping/Psychosocial   Plan of Care Reviewed With patient   OTHER   Outcome Summary 54/M pt R total hip. A/Ox4 BRP w/assist x1. Pain control main goal, with PO meds.q4hrs. Planning to DC today.

## 2019-10-17 NOTE — PROGRESS NOTES
Isaias Crisostomo MD     Orthopedic Progress Note    Subjective :   Awake and resting comfortably in bed.  Feeling very well.  Denies any chest pain shortness of breath nausea or vomiting.  Has been voiding.    Objective :    Vital signs in last 24 hours:  Temp:  [97 °F (36.1 °C)-98.6 °F (37 °C)] 97 °F (36.1 °C)  Heart Rate:  [53-87] 75  Resp:  [8-20] 16  BP: (100-130)/(60-83) 120/78  Vitals:    10/16/19 1530 10/16/19 1925 10/16/19 2300 10/17/19 0316   BP: 100/61 120/72 118/71 120/78   BP Location: Right arm Left arm Left arm Left arm   Patient Position: Sitting Lying Lying Lying   Pulse: 68 75 64 75   Resp: 16 16 16 16   Temp: 97.6 °F (36.4 °C) 97 °F (36.1 °C) 97.5 °F (36.4 °C) 97 °F (36.1 °C)   TempSrc: Oral Oral Oral Oral   SpO2: 96% 97% 98% 94%   Weight:       Height:           PHYSICAL EXAM:  Patient is calm, in no acute distress, awake and oriented x 3.  right anterior hip dressing is clean, dry and intact.  Sensation is intact to light touch in LFCN distribution  Femoral nerve function intact  Mild swelling to the thigh  Minimal ecchymosis  Sensation is intact to light touch in L1-S1 dermatomes, L1-S1 myotomes intact  Foot is warm and well perfused      LABS:  Results from last 7 days   Lab Units 10/17/19  0533   HEMOGLOBIN g/dL 12.0*   HEMATOCRIT % 36.8*     Results from last 7 days   Lab Units 10/17/19  0533   SODIUM mmol/L 140   POTASSIUM mmol/L 3.9   CHLORIDE mmol/L 102   CO2 mmol/L 29.4*   BUN mg/dL 10   CREATININE mg/dL 0.79   GLUCOSE mg/dL 110*   CALCIUM mg/dL 8.7           Imaging:      ASSESSMENT:  Status post right total hip arthroplasty anterior approach    Plan:  Continue Physical Therapy, weightbearing as tolerated to the right lower extremity no hip precautions.  Keep ice to incision  Continue SCDs, Continue aspirin enteric-coated tablets for DVT prophylaxis.  Dispo planning for discharge to home today after morning physical therapy.    Isaias Crisostomo MD    Date: 10/17/2019  Time: 7:47  AM    Isaias Crisostomo MD  Orthopaedic Surgeon    [facility]  [phone]

## 2019-10-17 NOTE — DISCHARGE SUMMARY
Discharge Summary    Date of Admission: 10/16/2019  7:10 AM  Date of Discharge:  10/17/2019    Discharge Diagnosis:   H/O total hip arthroplasty [Z96.649]  Right    PMHX:   Past Medical History:   Diagnosis Date   • Arthritis     DIDIER-SEES DR GRUBBS   • Hypertension    • Sleep apnea     USES C-PAP       Discharge Disposition  Home or Self Care    Procedures Performed  Procedure(s):  TOTAL HIP ARTHROPLASTY ANTERIOR WITH HANA TABLE       Indication for Admission  Patient is a 54 y.o. male admitted after undergoing the above surgical procedure. They were admitted for post-operative pain control, medical management and physical therapy.  They progressed with physical therapy.    They were deemed stable for discharge.      Consults:   Consults     No orders found for last 30 day(s).          Discharge Instructions:  Patient is weight bearing as tolerated on the operative leg.  Patient is to progress range of motion and ambulation as tolerated.  Use walker as needed for stability and gait.  May progress to cane as tolerated.  The dressing should be left on knee until post-operative day 7, and then removed.  Dressing is waterproof. Patient may shower 3 days after the operation with the dressing in place. Replace the dressing if it becomes wet or saturated. Use compression stockings for leg swelling.  Patient will follow-up in the office in 3 weeks.  Call the office at [phone] for any questions or concerns.      Discharge Medications     Discharge Medications      New Medications      Instructions Start Date   aspirin 325 MG EC tablet   325 mg, Oral, Every 12 Hours Scheduled      HYDROcodone-acetaminophen 7.5-325 MG per tablet  Commonly known as:  NORCO   1-2 tablets, Oral, Every 6 Hours PRN      traMADol 50 MG tablet  Commonly known as:  ULTRAM   50 mg, Oral, Every 8 Hours PRN         Continue These Medications      Instructions Start Date   amLODIPine-benazepril 10-40 MG per capsule  Commonly known as:  LOTREL   TK  1 C PO QD      celecoxib 100 MG capsule  Commonly known as:  CeleBREX   100 mg, Oral, Daily, PT TO HOLD 5 DAYS PRIOR TO SX      tocilizumab in sodium chloride 0.9 % IVPB   Intravenous, Every 30 Days         Stop These Medications    ibuprofen 800 MG tablet  Commonly known as:  ADVIL,MOTRIN            Discharge Diet: Regular diet    Activity at Discharge: Weight bearing as tolerated, activity as tolerated    Follow-up Appointments  No future appointments.           10/17/19,  7:50 AM    Isaias Crisostomo MD  Orthopaedic Surgeon    [facility]  [phone]

## 2019-10-17 NOTE — PAYOR COMM NOTE
"UR CONTACT:  HUNTER  P: [phone]        F: [phone]  DISCHARGED      Anthony Cota (54 y.o. Male)     Date of Birth Social Security Number Address Home Phone MRN    1965  [address] [facility] [facility] [address] [phone] [id number]    Mandaen Marital Status          Evangelical        Admission Date Admission Type Admitting Provider Attending Provider Department, Room/Bed    10/16/19 Elective Isaias Crisostomo MD  [address], P799/1    Discharge Date Discharge Disposition Discharge Destination        10/17/2019 Home or Self Care              Attending Provider:  (none)   Allergies:  Codeine    Isolation:  None   Infection:  None   Code Status:  CPR    Ht:  177.8 cm (70\")   Wt:  89.1 kg (196 lb 7 oz)    Admission Cmt:  None   Principal Problem:  None                Active Insurance as of 10/16/2019     Primary Coverage     Payor Plan Insurance Group Employer/Plan Group    ANTHEM BLUE CROSS ANTHEM BLUE CROSS BLUE SHIELD PPO [id number]     Payor Plan Address Payor Plan Phone Number Payor Plan Fax Number Effective Dates    [address] [phone]  6/26/2019 - None Entered    [place] [address]       Subscriber Name Subscriber Birth Date Member ID       ANTHONY COTA 1965 [id number]                 Emergency Contacts      (Rel.) Home Phone Work Phone Mobile Phone    AMANDA COTA [phone] -- [phone]               Discharge Summary      Isaias Crisostomo MD at 10/17/19 0750              Discharge Summary    Date of Admission: 10/16/2019  7:10 AM  Date of Discharge:  10/17/2019    Discharge Diagnosis:   H/O total hip arthroplasty [Z96.649]  Right    PMHX:   Past Medical History:   Diagnosis Date   • Arthritis     R.A.-SEES DR GRUBBS   • Hypertension    • Sleep apnea     USES C-PAP       Discharge Disposition  Home or Self Care    Procedures Performed  Procedure(s):  TOTAL HIP ARTHROPLASTY ANTERIOR WITH HANA TABLE       Indication for " Admission  Patient is a 54 y.o. male admitted after undergoing the above surgical procedure. They were admitted for post-operative pain control, medical management and physical therapy.  They progressed with physical therapy.    They were deemed stable for discharge.      Consults:   Consults     No orders found for last 30 day(s).          Discharge Instructions:  Patient is weight bearing as tolerated on the operative leg.  Patient is to progress range of motion and ambulation as tolerated.  Use walker as needed for stability and gait.  May progress to cane as tolerated.  The dressing should be left on knee until post-operative day 7, and then removed.  Dressing is waterproof. Patient may shower 3 days after the operation with the dressing in place. Replace the dressing if it becomes wet or saturated. Use compression stockings for leg swelling.  Patient will follow-up in the office in 3 weeks.  Call the office at [phone] for any questions or concerns.      Discharge Medications     Discharge Medications      New Medications      Instructions Start Date   aspirin 325 MG EC tablet   325 mg, Oral, Every 12 Hours Scheduled      HYDROcodone-acetaminophen 7.5-325 MG per tablet  Commonly known as:  NORCO   1-2 tablets, Oral, Every 6 Hours PRN      traMADol 50 MG tablet  Commonly known as:  ULTRAM   50 mg, Oral, Every 8 Hours PRN         Continue These Medications      Instructions Start Date   amLODIPine-benazepril 10-40 MG per capsule  Commonly known as:  LOTREL   TK 1 C PO QD      celecoxib 100 MG capsule  Commonly known as:  CeleBREX   100 mg, Oral, Daily, PT TO HOLD 5 DAYS PRIOR TO SX      tocilizumab in sodium chloride 0.9 % IVPB   Intravenous, Every 30 Days         Stop These Medications    ibuprofen 800 MG tablet  Commonly known as:  ADVIL,MOTRIN            Discharge Diet: Regular diet    Activity at Discharge: Weight bearing as tolerated, activity as tolerated    Follow-up Appointments  No future  appointments.           10/17/19,  7:50 AM    Isaias Crisostomo MD  Orthopaedic Surgeon    [facility]  [phone]              Electronically signed by Isaias Crisostomo MD at 10/17/19 3443

## 2019-10-17 NOTE — PLAN OF CARE
Problem: Patient Care Overview  Goal: Plan of Care Review   10/17/19 1051   Coping/Psychosocial   Plan of Care Reviewed With patient;spouse   OTHER   Outcome Summary Pt improving with ther ex tolerance and functional mobility. Able to increase ther ex repetitions and ambulatory distance. Pt also demonstrated ability to navigate stairs using step-to pattern with SBA.  Commsalvador ordered, DC PT.     Plan of Care Review   Progress improving

## 2019-10-17 NOTE — PLAN OF CARE
Problem: Patient Care Overview  Goal: Plan of Care Review  Outcome: Ongoing (interventions implemented as appropriate)   10/17/19 0541   Coping/Psychosocial   Plan of Care Reviewed With patient   OTHER   Outcome Summary 54/M pt R total hip. A/Ox4 BRP w/assist x1. Pain control main goal, with PO meds.q4hrs. Planning to DC today.    Plan of Care Review   Progress improving     Goal: Individualization and Mutuality  Outcome: Ongoing (interventions implemented as appropriate)    Goal: Interprofessional Rounds/Family Conf  Outcome: Ongoing (interventions implemented as appropriate)      Problem: Hip Arthroplasty (Total, Partial) (Adult)  Goal: Signs and Symptoms of Listed Potential Problems Will be Absent, Minimized or Managed (Hip Arthroplasty)  Outcome: Ongoing (interventions implemented as appropriate)    Goal: Anesthesia/Sedation Recovery  Outcome: Ongoing (interventions implemented as appropriate)      Problem: Fall Risk (Adult)  Goal: Absence of Fall  Outcome: Outcome(s) achieved Date Met: 10/17/19      Problem: Pain, Acute (Adult)  Goal: Acceptable Pain Control/Comfort Level  Outcome: Ongoing (interventions implemented as appropriate)

## 2019-10-17 NOTE — THERAPY TREATMENT NOTE
Patient Name: Anthony Vega  : 1965    MRN: [id number]                              Today's Date: 10/17/2019       Admit Date: 10/16/2019    Visit Dx:     ICD-10-CM ICD-9-CM   1. History of total right hip replacement Z96.641 V43.64     Patient Active Problem List   Diagnosis   • H/O total hip arthroplasty     Past Medical History:   Diagnosis Date   • Arthritis     R.A.-SEES DR GRUBBS   • Hypertension    • Sleep apnea     USES C-PAP     Past Surgical History:   Procedure Laterality Date   • HERNIA REPAIR Bilateral     X 2-DR MERAZ   • TONSILLECTOMY      CHILDHOOD   • TOTAL HIP ARTHROPLASTY Right 10/16/2019    Procedure: TOTAL HIP ARTHROPLASTY ANTERIOR WITH HANA TABLE;  Surgeon: Isaias Crisostomo MD;  Location: [facility];  Service: Orthopedics     General Information     Row Name 10/17/19 1039          PT Evaluation Time/Intention    Document Type  therapy note (daily note);discharge treatment  (Pended)   -SP     Mode of Treatment  physical therapy  (Pended)   -SP     Row Name 10/17/19 1039          General Information    Patient Profile Reviewed?  yes  (Pended)   -SP     Existing Precautions/Restrictions  fall  (Pended)   -SP     Row Name 10/17/19 1039          Cognitive Assessment/Intervention- PT/OT    Orientation Status (Cognition)  oriented x 3  (Pended)   -SP       User Key  (r) = Recorded By, (t) = Taken By, (c) = Cosigned By    Initials Name Provider Type    SP Renata Hernandez, PT Student PT Student        Mobility     Row Name 10/17/19 1040          Sit-Stand Transfer    Sit-Stand [place] (Transfers)  stand by assist  (Pended)   -SP     Assistive Device (Sit-Stand Transfers)  walker, front-wheeled  (Pended)   -SP     Row Name 10/17/19 1040          Gait/Stairs Assessment/Training    Gait/Stairs Assessment/Training  gait/ambulation independence  (Pended)   -SP     [place] Level (Gait)  conditional independence  (Pended)   -SP     Assistive Device (Gait)  walker, front-wheeled  (Pended)    -SP     Distance in Feet (Gait)  150 feet  (Pended)   -SP     Pattern (Gait)  step-through  (Pended)   -SP     Deviations/Abnormal Patterns (Gait)  antalgic  (Pended)   -SP     Right Sided Gait Deviations  heel strike decreased  (Pended)   -SP     [place] Level (Stairs)  contact guard  (Pended)   -SP     Handrail Location (Stairs)  other (see comments)  (Pended)   No handrail, but holds on to door frame with L hand; practiced with L hand on handrail  -SP     Number of Steps (Stairs)  --  (Pended)  Has 2 steps at home, did 4 in gym  -SP     Ascending Technique (Stairs)  step-to-step  (Pended)   -SP     Descending Technique (Stairs)  step-to-step  (Pended)   -SP     Comment (Gait/Stairs)  education on steps  (Pended)   -SP     Row Name 10/17/19 1040          Mobility Assessment/Intervention    Extremity Weight-bearing Status  right lower extremity  (Pended)   -SP     Right Lower Extremity (Weight-bearing Status)  weight-bearing as tolerated (WBAT)  (Pended)   -SP       User Key  (r) = Recorded By, (t) = Taken By, (c) = Cosigned By    Initials Name Provider Type    Renata Vickers, PT Student PT Student        Obj/Interventions     Row Name 10/17/19 1041          Therapeutic Exercise    Comment (Therapeutic Exercise)  Right MEENAKSHI Exercise Program x15 reps  (Pended)   -SP       User Key  (r) = Recorded By, (t) = Taken By, (c) = Cosigned By    Initials Name Provider Type    Renata Vickers, PT Student PT Student        Goals/Plan    No documentation.       Clinical Impression     Row Name 10/17/19 1048          Pain Assessment    Additional Documentation  Pain Scale: Numbers Pre/Post-Treatment (Group)  (Pended)   -SP     Row Name 10/17/19 1048          Pain Scale: Numbers Pre/Post-Treatment    Pain Scale: Numbers, Pretreatment  6/10  (Pended)   -SP     Pain Location - Side  Right  (Pended)   -SP     Pain Location  hip  (Pended)   -SP     Pain Intervention(s)  Ambulation/increased activity;Repositioned;Medication  (See MAR)  (Pended)   -SP     Row Name 10/17/19 1048          Plan of Care Review    Plan of Care Reviewed With  patient;spouse  (Pended)   -SP     Row Name 10/17/19 1048          Vital Signs    O2 Delivery Pre Treatment  room air  (Pended)   -SP     O2 Delivery Intra Treatment  room air  (Pended)   -SP     O2 Delivery Post Treatment  room air  (Pended)   -SP     Row Name 10/17/19 1048          Positioning and Restraints    Pre-Treatment Position  sitting in chair/recliner  (Pended)   -SP     Post Treatment Position  chair  (Pended)   -SP     In Chair  call light within reach;exit alarm on;sitting;with family/caregiver;with other staff  (Pended)   -SP       User Key  (r) = Recorded By, (t) = Taken By, (c) = Cosigned By    Initials Name Provider Type    Renata Vickers, PT Student PT Student        Outcome Measures     Row Name 10/17/19 1052          How much help from another person do you currently need...    Turning from your back to your side while in flat bed without using bedrails?  4  (Pended)   -SP     Moving from lying on back to sitting on the side of a flat bed without bedrails?  4  (Pended)   -SP     Moving to and from a bed to a chair (including a wheelchair)?  3  (Pended)   -SP     Standing up from a chair using your arms (e.g., wheelchair, bedside chair)?  4  (Pended)   -SP     Climbing 3-5 steps with a railing?  3  (Pended)   -SP     To walk in hospital room?  4  (Pended)   -SP     AM-PAC 6 Clicks Score (PT)  22  (Pended)   -SP     Row Name 10/17/19 1052          Functional Assessment    Outcome Measure Options  AM-PAC 6 Clicks Basic Mobility (PT)  (Pended)   -SP       User Key  (r) = Recorded By, (t) = Taken By, (c) = Cosigned By    Initials Name Provider Type    Renata Vickers, PT Student PT Student        Physical Therapy Education     Title: PT OT SLP Therapies (Done)     Topic: Physical Therapy (Done)     Point: Mobility training (Done)     Learning Progress Summary           Patient  Acceptance, E, VU by SP at 10/17/2019 10:50 AM    Acceptance, E,TB,D, VU,NR by SV at 10/16/2019  3:53 PM   Significant Other Acceptance, E, VU by SP at 10/17/2019 10:50 AM    Acceptance, E,TB,D, VU,NR by SV at 10/16/2019  3:53 PM                   Point: Home exercise program (Done)     Learning Progress Summary           Patient Acceptance, E, VU by SP at 10/17/2019 10:50 AM    Acceptance, E,TB,D, VU,NR by SV at 10/16/2019  3:53 PM   Significant Other Acceptance, E, VU by SP at 10/17/2019 10:50 AM    Acceptance, E,TB,D, VU,NR by SV at 10/16/2019  3:53 PM                   Point: Precautions (Done)     Learning Progress Summary           Patient Acceptance, E, VU by SP at 10/17/2019 10:50 AM    Acceptance, E,TB,D, VU,NR by SV at 10/16/2019  3:53 PM   Significant Other Acceptance, E, VU by SP at 10/17/2019 10:50 AM    Acceptance, E,TB,D, VU,NR by SV at 10/16/2019  3:53 PM                               User Key     Initials Effective Dates Name Provider Type Discipline     04/03/18 -  Valerie Alba, PT Physical Therapist PT    SP 10/11/19 -  Renata Hernandez PT Student PT Student PT              PT Recommendation and Plan     Outcome Summary/Treatment Plan (PT)  Anticipated Discharge Disposition (PT): (P) home with assist, home with home health  Plan of Care Reviewed With: (P) patient, spouse  Progress: (P) improving  Outcome Summary: (P) Pt improving with ther ex tolerance and functional mobility. Able to increase ther ex repetitions and ambulatory distance. Pt also demonstrated ability to navigate stairs using step-to pattern with SBA.     Time Calculation:   PT Charges     Row Name 10/17/19 1053             Time Calculation    Start Time  0932  (Pended)   -SP      Stop Time  0951  (Pended)   -SP      Time Calculation (min)  19 min  (Pended)   -SP      PT Received On  10/17/19  (Pended)   -SP        User Key  (r) = Recorded By, (t) = Taken By, (c) = Cosigned By    Initials Name Provider Type    SP Renata Hernandez  PT Student PT Student        Therapy Charges for Today     Code Description Service Date Service Provider Modifiers Qty    [id number]  PT THER PROC EA 15 MIN 10/17/2019 Renata Hernandez, PT Student GP 1    [id number]  PT THER PROC GROUP 10/17/2019 Renata Hernandez, PT Student GP 1          PT G-Codes  Outcome Measure Options: (P) AM-PAC 6 Clicks Basic Mobility (PT)  AM-PAC 6 Clicks Score (PT): (P) 22    Renata Hernandez PT Student  10/17/2019

## 2021-06-07 ENCOUNTER — HOSPITAL ENCOUNTER (OUTPATIENT)
Dept: CARDIOLOGY | Facility: HOSPITAL | Age: 56
Discharge: HOME | End: 2021-06-07

## 2021-06-07 ENCOUNTER — LAB (OUTPATIENT)
Dept: LAB | Facility: HOSPITAL | Age: 56
End: 2021-06-07

## 2021-06-07 ENCOUNTER — TRANSCRIBE ORDERS (OUTPATIENT)
Dept: CARDIOLOGY | Facility: HOSPITAL | Age: 56
End: 2021-06-07

## 2021-06-07 ENCOUNTER — HOSPITAL ENCOUNTER (OUTPATIENT)
Dept: GENERAL RADIOLOGY | Facility: HOSPITAL | Age: 56
Discharge: HOME | End: 2021-06-07

## 2021-06-07 ENCOUNTER — TRANSCRIBE ORDERS (OUTPATIENT)
Dept: ADMINISTRATIVE | Facility: HOSPITAL | Age: 56
End: 2021-06-07

## 2021-06-07 DIAGNOSIS — Z01.818 PRE-OP EXAM: ICD-10-CM

## 2021-06-07 DIAGNOSIS — Z01.811 PRE-OP CHEST EXAM: Primary | ICD-10-CM

## 2021-06-07 DIAGNOSIS — M17.12 LOCALIZED OSTEOARTHRITIS OF LEFT KNEE: ICD-10-CM

## 2021-06-07 DIAGNOSIS — M17.12 LOCALIZED OSTEOARTHRITIS OF LEFT KNEE: Primary | ICD-10-CM

## 2021-06-07 LAB
ALBUMIN SERPL-MCNC: 4.3 G/DL (ref 3.5–5.2)
ALBUMIN/GLOB SERPL: 1.9 G/DL
ALP SERPL-CCNC: 61 U/L (ref 39–117)
ALT SERPL W P-5'-P-CCNC: 14 U/L (ref 1–41)
ANION GAP SERPL CALCULATED.3IONS-SCNC: 7.9 MMOL/L (ref 5–15)
AST SERPL-CCNC: 14 U/L (ref 1–40)
BILIRUB SERPL-MCNC: 0.9 MG/DL (ref 0–1.2)
BUN SERPL-MCNC: 17 MG/DL (ref 6–20)
BUN/CREAT SERPL: 18.9 (ref 7–25)
CALCIUM SPEC-SCNC: 9.3 MG/DL (ref 8.6–10.5)
CHLORIDE SERPL-SCNC: 105 MMOL/L (ref 98–107)
CO2 SERPL-SCNC: 24.1 MMOL/L (ref 22–29)
CREAT SERPL-MCNC: 0.9 MG/DL (ref 0.76–1.27)
DEPRECATED RDW RBC AUTO: 42.9 FL (ref 37–54)
ERYTHROCYTE [DISTWIDTH] IN BLOOD BY AUTOMATED COUNT: 13.9 % (ref 12.3–15.4)
GFR SERPL CREATININE-BSD FRML MDRD: 87 ML/MIN/1.73
GLOBULIN UR ELPH-MCNC: 2.3 GM/DL
GLUCOSE SERPL-MCNC: 98 MG/DL (ref 65–99)
HBA1C MFR BLD: 5.3 % (ref 4.8–5.6)
HCT VFR BLD AUTO: 42.6 % (ref 37.5–51)
HGB BLD-MCNC: 13.7 G/DL (ref 13–17.7)
INR PPP: 1.06 (ref 0.9–1.1)
MCH RBC QN AUTO: 27.7 PG (ref 26.6–33)
MCHC RBC AUTO-ENTMCNC: 32.2 G/DL (ref 31.5–35.7)
MCV RBC AUTO: 86.1 FL (ref 79–97)
PLATELET # BLD AUTO: 196 10*3/MM3 (ref 140–450)
PMV BLD AUTO: 9.9 FL (ref 6–12)
POTASSIUM SERPL-SCNC: 3.9 MMOL/L (ref 3.5–5.2)
PROT SERPL-MCNC: 6.6 G/DL (ref 6–8.5)
PROTHROMBIN TIME: 13.6 SECONDS (ref 11.7–14.2)
RBC # BLD AUTO: 4.95 10*6/MM3 (ref 4.14–5.8)
SODIUM SERPL-SCNC: 137 MMOL/L (ref 136–145)
WBC # BLD AUTO: 6.01 10*3/MM3 (ref 3.4–10.8)

## 2021-06-07 PROCEDURE — 85027 COMPLETE CBC AUTOMATED: CPT

## 2021-06-07 PROCEDURE — 80053 COMPREHEN METABOLIC PANEL: CPT

## 2021-06-07 PROCEDURE — 85610 PROTHROMBIN TIME: CPT

## 2021-06-07 PROCEDURE — 93005 ELECTROCARDIOGRAM TRACING: CPT

## 2021-06-07 PROCEDURE — 93010 ELECTROCARDIOGRAM REPORT: CPT | Performed by: INTERNAL MEDICINE

## 2021-06-07 PROCEDURE — 83036 HEMOGLOBIN GLYCOSYLATED A1C: CPT

## 2021-06-07 PROCEDURE — 71046 X-RAY EXAM CHEST 2 VIEWS: CPT

## 2021-06-07 PROCEDURE — 36415 COLL VENOUS BLD VENIPUNCTURE: CPT

## 2021-06-08 LAB — QT INTERVAL: 397 MS
